# Patient Record
Sex: MALE | Race: WHITE | NOT HISPANIC OR LATINO | Employment: OTHER | URBAN - METROPOLITAN AREA
[De-identification: names, ages, dates, MRNs, and addresses within clinical notes are randomized per-mention and may not be internally consistent; named-entity substitution may affect disease eponyms.]

---

## 2017-01-04 NOTE — TELEPHONE ENCOUNTER
Was the patient seen in the last year in this department? Yes     Does patient have an active prescription for medications requested? Yes     Received Request Via: Pharmacy     Rx sent to wrong pharmacy. Please re-sign

## 2017-02-02 DIAGNOSIS — I10 ESSENTIAL HYPERTENSION: ICD-10-CM

## 2017-02-02 DIAGNOSIS — Z95.1 S/P CABG X 1: ICD-10-CM

## 2017-02-07 DIAGNOSIS — E78.5 DYSLIPIDEMIA, GOAL LDL BELOW 70: ICD-10-CM

## 2017-02-10 ENCOUNTER — TELEPHONE (OUTPATIENT)
Dept: CARDIOLOGY | Facility: MEDICAL CENTER | Age: 67
End: 2017-02-10

## 2017-02-15 ENCOUNTER — TELEPHONE (OUTPATIENT)
Dept: CARDIOLOGY | Facility: MEDICAL CENTER | Age: 67
End: 2017-02-15

## 2017-02-15 DIAGNOSIS — I25.10 CORONARY ARTERY DISEASE INVOLVING NATIVE CORONARY ARTERY OF NATIVE HEART WITHOUT ANGINA PECTORIS: ICD-10-CM

## 2017-02-15 DIAGNOSIS — Z95.1 HX OF CABG: ICD-10-CM

## 2017-02-15 DIAGNOSIS — E78.5 DYSLIPIDEMIA, GOAL LDL BELOW 70: ICD-10-CM

## 2017-02-15 NOTE — TELEPHONE ENCOUNTER
----- Message from Jessica Medrano sent at 2/15/2017 12:57 PM PST -----  Regarding: Pharmacy needs call back asap about Praulent  IA/Alexandra    Please call Irma at [facility] at [phone] ext [phone] asap. Patient's prescription for Praulent doesn't come in syringes, only in pre-filled. They need a verbal prescription and patient needs this by tomorrow, 2/16.

## 2017-02-15 NOTE — TELEPHONE ENCOUNTER
Updated Rx for Praluent auto-injector pens (150 mg every 14 days) sent to [facility].    MICHAEL RN

## 2017-03-06 ENCOUNTER — TELEPHONE (OUTPATIENT)
Dept: MEDICAL GROUP | Facility: MEDICAL CENTER | Age: 67
End: 2017-03-06

## 2017-03-06 ENCOUNTER — OFFICE VISIT (OUTPATIENT)
Dept: URGENT CARE | Facility: PHYSICIAN GROUP | Age: 67
End: 2017-03-06
Payer: MEDICARE

## 2017-03-06 VITALS
HEART RATE: 98 BPM | DIASTOLIC BLOOD PRESSURE: 68 MMHG | BODY MASS INDEX: 32.27 KG/M2 | SYSTOLIC BLOOD PRESSURE: 100 MMHG | RESPIRATION RATE: 16 BRPM | OXYGEN SATURATION: 93 % | WEIGHT: 238 LBS | TEMPERATURE: 99.1 F

## 2017-03-06 DIAGNOSIS — M54.50 ACUTE LEFT-SIDED LOW BACK PAIN WITHOUT SCIATICA: ICD-10-CM

## 2017-03-06 DIAGNOSIS — Z86.19 HISTORY OF HERPES ZOSTER: ICD-10-CM

## 2017-03-06 PROCEDURE — 1036F TOBACCO NON-USER: CPT | Performed by: FAMILY MEDICINE

## 2017-03-06 PROCEDURE — G8482 FLU IMMUNIZE ORDER/ADMIN: HCPCS | Performed by: FAMILY MEDICINE

## 2017-03-06 PROCEDURE — 1101F PT FALLS ASSESS-DOCD LE1/YR: CPT | Performed by: FAMILY MEDICINE

## 2017-03-06 PROCEDURE — 99214 OFFICE O/P EST MOD 30 MIN: CPT | Performed by: FAMILY MEDICINE

## 2017-03-06 PROCEDURE — 4040F PNEUMOC VAC/ADMIN/RCVD: CPT | Performed by: FAMILY MEDICINE

## 2017-03-06 PROCEDURE — G8598 ASA/ANTIPLAT THER USED: HCPCS | Performed by: FAMILY MEDICINE

## 2017-03-06 PROCEDURE — G8419 CALC BMI OUT NRM PARAM NOF/U: HCPCS | Performed by: FAMILY MEDICINE

## 2017-03-06 PROCEDURE — 3017F COLORECTAL CA SCREEN DOC REV: CPT | Mod: 1P | Performed by: FAMILY MEDICINE

## 2017-03-06 PROCEDURE — G8432 DEP SCR NOT DOC, RNG: HCPCS | Performed by: FAMILY MEDICINE

## 2017-03-06 RX ORDER — OXYCODONE HYDROCHLORIDE AND ACETAMINOPHEN 5; 325 MG/1; MG/1
1-2 TABLET ORAL EVERY 6 HOURS PRN
Qty: 15 TAB | Refills: 0 | Status: SHIPPED | OUTPATIENT
Start: 2017-03-06 | End: 2017-04-18

## 2017-03-06 RX ORDER — VALACYCLOVIR HYDROCHLORIDE 1 G/1
1000 TABLET, FILM COATED ORAL 3 TIMES DAILY
Qty: 21 TAB | Refills: 0 | Status: SHIPPED | OUTPATIENT
Start: 2017-03-06 | End: 2017-03-13

## 2017-03-06 ASSESSMENT — ENCOUNTER SYMPTOMS
EYE DISCHARGE: 0
FOCAL WEAKNESS: 0
SENSORY CHANGE: 0
EYE REDNESS: 0
MYALGIAS: 0

## 2017-03-06 NOTE — TELEPHONE ENCOUNTER
Should go into urgent care.  They can call in different meds and should confirm this is shingles before starting acyclovir.

## 2017-03-06 NOTE — PROGRESS NOTES
Subjective:      Bruno Loera is a 67 y.o. male who presents with Immunizations            HPI  3 days progressively worse left lower lumbar severe burning pain migration in dermatomal pattern. No rash but has PMH zoster x4. No fever. No trauma. No unwanted weight loss. No positional.   No other aggravating or alleviating factors.    Review of Systems   Eyes: Negative for discharge and redness.   Musculoskeletal: Negative for myalgias and joint pain.   Skin: Negative for itching.   Neurological: Negative for sensory change and focal weakness.        No myelopathy     .  Medications, Allergies, and current problem list reviewed today in Epic       Objective:     /68 mmHg  Pulse 98  Temp(Src) 37.3 °C (99.1 °F)  Resp 16  Wt 107.956 kg (238 lb)  SpO2 93%     Physical Exam   Constitutional: He appears well-developed and well-nourished. No distress.   Musculoskeletal:        Back:    Skin: Skin is warm and dry. No rash noted.               Assessment/Plan:     1. Acute left-sided low back pain without sciatica    - valacyclovir (VALTREX) 1 GM Tab; Take 1 Tab by mouth 3 times a day for 7 days.  Dispense: 21 Tab; Refill: 0  - oxycodone-acetaminophen (PERCOCET) 5-325 MG Tab; Take 1-2 Tabs by mouth every 6 hours as needed.  Dispense: 15 Tab; Refill: 0    2. History of herpes zoster    - valacyclovir (VALTREX) 1 GM Tab; Take 1 Tab by mouth 3 times a day for 7 days.  Dispense: 21 Tab; Refill: 0    Given history and symptoms feeling like previous zoster will treat despite a rash.   Differential diagnosis, natural history, supportive care, and indications for immediate follow-up discussed at length.

## 2017-03-06 NOTE — MR AVS SNAPSHOT
Bruno Taylor Evaristo   3/6/2017 11:15 AM   Office Visit   MRN: [id number]    Department:  [facility]   Dept Phone:  [phone]    Description:  Male : 1950   Provider:  Antonio Clarke M.D.           Reason for Visit     Immunizations poss shingle L spine back, notice Friday pm,cant sleep, constant pain      Allergies as of 3/6/2017     Allergen Noted Reactions    Statins [Hmg-Coa-R Inhibitors] 01/10/2013   Unspecified    Muscle cramps/pain, gout  Pt has tried several different ones and all with problems  TLB=8843      You were diagnosed with     Acute left-sided low back pain without sciatica   [4267089]       History of herpes zoster   [815357]         Vital Signs     Blood Pressure Pulse Temperature Respirations Weight Oxygen Saturation    100/68 mmHg 98 37.3 °C (99.1 °F) 16 107.956 kg (238 lb) 93%    Smoking Status                   Former Smoker           Basic Information     Date Of Birth Sex Race Ethnicity Preferred Language    1950 Male White Non- English      Your appointments     2017  9:00 AM   Established Patient with [facility] EXAM 4   [facility]  (--)    [address] [facility]  [place] [address]   [phone]            2017 10:00 AM   PACER CHECK ONLY with PACER CHECK-CAM B   [facility] (--)    [address] [place] [address] [address]  [place] [address] [phone]            2017 10:45 AM   FOLLOW UP with Carloz Mcallister M.D.   [facility] (--)    [address] [facility] [facility] [address]  [place] [address] [phone]            2017 10:20 AM   Established Patient with Cezar Farias M.D.   [facility] [address]    [address]  [place] [address] [phone]           You will be receiving a confirmation call a few days before your appointment from our automated call confirmation system.             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 High  11/29/2015 - Present    S/P CABG x 1 Z95.1 Medium  11/29/2015 - Present    H/O aortic valve replacement Z95.2 Medium  11/29/2015 - Present    Anemia D64.9 Low  11/29/2015 - Present    Controlled type 2 diabetes mellitus without complication, without long-term current use of insulin (CMS-HCC) E11.9 Medium  11/29/2015 - Present    History of heart valve replacement with bioprosthetic valve [Z95.4] Z95.4 Medium  12/1/2015 - Present    Cardiac pacemaker in situ Z95.0 Medium  12/17/2015 - Present    Non morbid obesity due to excess calories E66.09   8/1/2016 - Present    Ulnar neuropathy of right upper extremity G56.21   8/1/2016 - Present    Coronary artery disease involving native heart without angina pectoris I25.10   8/1/2016 - Present      Health Maintenance        Date Due Completion Dates    IMM DTaP/Tdap/Td Vaccine (1 - Tdap) 1/28/1967 ---    IMM ZOSTER VACCINE 1/28/2008 ---    IMM PNEUMOCOCCAL 65+ (ADULT) LOW/MEDIUM RISK SERIES (2 of 2 - PPSV23) 1/16/2017 1/16/2016    A1C SCREENING 5/2/2017 11/2/2016, 8/4/2016, 11/20/2015, 7/1/2015    DIABETES MONOFILAMENT / LE EXAM 8/1/2017 8/1/2016, 1/12/2015    URINE ACR / MICROALBUMIN 8/4/2017 8/4/2016    SERUM CREATININE 9/2/2017 9/2/2016, 8/4/2016, 1/13/2016, 11/30/2015, 11/29/2015, 11/28/2015, 11/28/2015, 11/27/2015, 11/26/2015, 11/25/2015, 11/24/2015, 11/20/2015, 10/29/2015, 9/21/2015, 9/20/2015, 7/1/2015    COLON CANCER SCREENING ANNUAL FIT 10/25/2017  10/25/2016    FASTING LIPID PROFILE 11/2/2017 11/2/2016, 8/4/2016, 4/13/2016, 9/21/2015, 7/1/2015    RETINAL SCREENING 12/22/2017 12/22/2016, 2/13/2015, 1/29/2015            Current Immunizations     13-VALENT PCV PREVNAR 1/16/2016 10:41 AM    Influenza TIV (IM) 11/5/2013, 1/10/2013    Influenza Vaccine Adult HD 10/12/2016, 1/16/2016 10:43 AM, 1/12/2015      Below and/or attached are the medications your provider expects you to take. Review all of your home medications and newly ordered medications with your provider and/or pharmacist. Follow medication instructions as directed by your provider and/or pharmacist. Please keep your medication list with you and share with your provider. Update the information when medications are discontinued, doses are changed, or new medications (including over-the-counter products) are added; and carry medication information at all times in the event of emergency situations     Allergies:  STATINS - Unspecified               Medications  Valid as of: March 06, 2017 - 11:53 AM    Generic Name Brand Name Tablet Size Instructions for use    Alirocumab (Solution Pen-injector) Alirocumab 150 MG/ML Inject 150 mg as instructed every 14 days.        Aspirin (Tab) aspirin 81 MG Take 81 mg by mouth every morning.        Blood Glucose Monitoring Suppl (Misc) Blood Glucose Monitoring Suppl SUPPLIES Test strips order: Test strips for tania contour meter. Sig: use tid        Coenzyme Q10   Take 300 mg by mouth every day.        Cyanocobalamin (Tab) VITAMIN B-12 100 MCG Take 100 mcg by mouth every day.        Glucose Blood (Strip) TANIA CONTOUR NEXT TEST  TEST THREE TIMES A DAY FOR UNCONTROLLED SUGAR LEVELS        HydroCHLOROthiazide (Tab) HYDRODIURIL 12.5 MG Take 1 Tab by mouth every day.        HydroCHLOROthiazide (Cap) MICROZIDE 12.5 MG Take  by mouth every day.        Insulin Glargine (Solution Pen-injector) LANTUS 100 UNIT/ML Inject 26 Units as instructed every evening.        Insulin Pen  Needle (Misc) Insulin Pen Needle 31G X 5 MM 1 Each by Does not apply route every day.        Liraglutide (Solution Pen-injector) VICTOZA 18 MG/3ML Inject 0.3 mL as instructed every day.        Lisinopril (Tab) PRINIVIL 20 MG Take 1 Tab by mouth 2 times a day.        MetFORMIN HCl (Tab) GLUCOPHAGE 1000 MG Take 1 Tab by mouth 2 times a day, with meals.        Metoprolol Tartrate (Tab) LOPRESSOR 25 MG Take 1 Tab by mouth 2 Times a Day.        Omeprazole (CAPSULE DELAYED RELEASE) PRILOSEC 20 MG Take 1 Cap by mouth every day.        Oxycodone-Acetaminophen (Tab) PERCOCET 5-325 MG Take 1-2 Tabs by mouth every 6 hours as needed.        ValACYclovir HCl (Tab) VALTREX 1 GM Take 1 Tab by mouth 3 times a day for 7 days.        .                 Medicines prescribed today were sent to:     [facility] #[id number] - [address] AT [address] [address]    Phone: [phone] Fax: [phone]    Open 24 Hours?: No    [facility] [address]    [address] [place] [address] [facility] [address]    Phone: [phone] Fax: [phone]    Open 24 Hours?: No      Medication refill instructions:       If your prescription bottle indicates you have medication refills left, it is not necessary to call your provider’s office. Please contact your pharmacy and they will refill your medication.    If your prescription bottle indicates you do not have any refills left, you may request refills at any time through one of the following ways: The online cisimple system (except Urgent Care), by calling your provider’s office, or by asking your pharmacy to contact your provider’s office with a refill request. Medication refills are processed only during regular business hours and may not be available until the next business day. Your provider may request additional information or to have a follow-up visit with you prior to refilling your medication.   *Please Note: Medication  refills are assigned a new Rx number when refilled electronically. Your pharmacy may indicate that no refills were authorized even though a new prescription for the same medication is available at the pharmacy. Please request the medicine by name with the pharmacy before contacting your provider for a refill.           [employer] Access Code: Activation code not generated  [employer] Status: Active

## 2017-03-06 NOTE — TELEPHONE ENCOUNTER
1. Caller Name: Katie patient wife                                         Call Back Number: [phone] (home)         Patient approves a detailed voicemail message: N\A    Katie, called for the patient she said that on Friday the patient broke out with shingles with a lot of pain and wanted to know if you would please send the medication Acyclovir to the patients pharmacy and something for pain? Thank you Please advise

## 2017-03-15 ENCOUNTER — TELEPHONE (OUTPATIENT)
Dept: CARDIOLOGY | Facility: MEDICAL CENTER | Age: 67
End: 2017-03-15

## 2017-03-15 DIAGNOSIS — E78.5 DYSLIPIDEMIA: ICD-10-CM

## 2017-03-15 DIAGNOSIS — I25.10 CORONARY ARTERY DISEASE INVOLVING NATIVE CORONARY ARTERY OF NATIVE HEART WITHOUT ANGINA PECTORIS: ICD-10-CM

## 2017-03-15 DIAGNOSIS — Z95.1 HX OF CABG: ICD-10-CM

## 2017-03-15 DIAGNOSIS — Z78.9 STATIN INTOLERANCE: ICD-10-CM

## 2017-03-15 NOTE — TELEPHONE ENCOUNTER
[facility] called and stated that the patient's berhane for Praulent has run out  Co-pay for the medication will be $1357/month and the patient is unable to pay for that    Currently there is no money in the berhane and the patient can be put on a wait list    [facility] call back number is [phone]    I will forward a message to the drug rep and request samples

## 2017-03-16 ENCOUNTER — TELEPHONE (OUTPATIENT)
Dept: CARDIOLOGY | Facility: MEDICAL CENTER | Age: 67
End: 2017-03-16

## 2017-03-16 NOTE — TELEPHONE ENCOUNTER
Repatha prescription sent to [facility].    Left patient a voicemail regarding medication change.    MICHAEL MARCOS

## 2017-03-16 NOTE — TELEPHONE ENCOUNTER
Received a call back from patient. Advised him of Repatha prescription and authorization process.    MICHAEL MARCOS

## 2017-03-16 NOTE — TELEPHONE ENCOUNTER
Irma from [facility] called and they stated that since they put the work into getting the patient approved for Praulent they are unable to assist with the Repatha PAR. Our office would need to submit that.   I will submit a PAR for Repatha today and start the process    Irma's number: [phone] ext [phone]

## 2017-03-22 ENCOUNTER — TELEPHONE (OUTPATIENT)
Dept: CARDIOLOGY | Facility: MEDICAL CENTER | Age: 67
End: 2017-03-22

## 2017-03-22 NOTE — TELEPHONE ENCOUNTER
Patient's samples of Repatha came in for patient to   Informed patient to  and stressed that we do not have a place to store the medication  He will attempt to  on 3/22/17  Or 3/23/17 morning at the latest (the medication should keep in the box it was shipped in)

## 2017-03-23 ENCOUNTER — TELEPHONE (OUTPATIENT)
Dept: MEDICAL GROUP | Facility: MEDICAL CENTER | Age: 67
End: 2017-03-23

## 2017-03-23 NOTE — TELEPHONE ENCOUNTER
1. Caller Name: orion                      Call Back Number: [phone] (home)       2. Message: patient is taking victoza and he has about 4 shots left and is in the donut hole and the cost is $400, he would like to know what he can take that's affordable?  He also would like his Lipid panel ordered so he can get that done before his next appt.     3. Patient approves office to leave a detailed voicemail/MyChart message: N\A

## 2017-03-28 DIAGNOSIS — E78.5 DYSLIPIDEMIA: ICD-10-CM

## 2017-04-03 ENCOUNTER — HOSPITAL ENCOUNTER (OUTPATIENT)
Dept: LAB | Facility: MEDICAL CENTER | Age: 67
End: 2017-04-03
Attending: INTERNAL MEDICINE
Payer: MEDICARE

## 2017-04-03 DIAGNOSIS — E11.9 CONTROLLED TYPE 2 DIABETES MELLITUS WITHOUT COMPLICATION, WITHOUT LONG-TERM CURRENT USE OF INSULIN (HCC): ICD-10-CM

## 2017-04-03 DIAGNOSIS — E78.5 DYSLIPIDEMIA: ICD-10-CM

## 2017-04-03 LAB
CHOLEST SERPL-MCNC: 103 MG/DL (ref 100–199)
EST. AVERAGE GLUCOSE BLD GHB EST-MCNC: 169 MG/DL
HBA1C MFR BLD: 7.5 % (ref 0–5.6)
HDLC SERPL-MCNC: 46 MG/DL
LDLC SERPL CALC-MCNC: 28 MG/DL
TRIGL SERPL-MCNC: 144 MG/DL (ref 0–149)

## 2017-04-03 PROCEDURE — 80061 LIPID PANEL: CPT

## 2017-04-06 ENCOUNTER — OFFICE VISIT (OUTPATIENT)
Dept: MEDICAL GROUP | Facility: MEDICAL CENTER | Age: 67
End: 2017-04-06
Payer: MEDICARE

## 2017-04-06 ENCOUNTER — TELEPHONE (OUTPATIENT)
Dept: CARDIOLOGY | Facility: MEDICAL CENTER | Age: 67
End: 2017-04-06

## 2017-04-06 VITALS
HEIGHT: 72 IN | BODY MASS INDEX: 32.78 KG/M2 | RESPIRATION RATE: 16 BRPM | HEART RATE: 102 BPM | OXYGEN SATURATION: 93 % | WEIGHT: 242 LBS | SYSTOLIC BLOOD PRESSURE: 126 MMHG | TEMPERATURE: 98.7 F | DIASTOLIC BLOOD PRESSURE: 68 MMHG

## 2017-04-06 DIAGNOSIS — E11.9 CONTROLLED TYPE 2 DIABETES MELLITUS WITHOUT COMPLICATION, WITHOUT LONG-TERM CURRENT USE OF INSULIN (HCC): ICD-10-CM

## 2017-04-06 PROCEDURE — G8417 CALC BMI ABV UP PARAM F/U: HCPCS | Performed by: INTERNAL MEDICINE

## 2017-04-06 PROCEDURE — 3045F PR MOST RECENT HEMOGLOBIN A1C LEVEL 7.0-9.0%: CPT | Performed by: INTERNAL MEDICINE

## 2017-04-06 PROCEDURE — 1101F PT FALLS ASSESS-DOCD LE1/YR: CPT | Performed by: INTERNAL MEDICINE

## 2017-04-06 PROCEDURE — G8432 DEP SCR NOT DOC, RNG: HCPCS | Performed by: INTERNAL MEDICINE

## 2017-04-06 PROCEDURE — 99213 OFFICE O/P EST LOW 20 MIN: CPT | Performed by: INTERNAL MEDICINE

## 2017-04-06 PROCEDURE — 4040F PNEUMOC VAC/ADMIN/RCVD: CPT | Performed by: INTERNAL MEDICINE

## 2017-04-06 PROCEDURE — 1036F TOBACCO NON-USER: CPT | Performed by: INTERNAL MEDICINE

## 2017-04-06 PROCEDURE — G8598 ASA/ANTIPLAT THER USED: HCPCS | Performed by: INTERNAL MEDICINE

## 2017-04-06 RX ORDER — GLIMEPIRIDE 4 MG/1
4 TABLET ORAL EVERY MORNING
Qty: 90 TAB | Refills: 3 | Status: SHIPPED | OUTPATIENT
Start: 2017-04-06 | End: 2017-07-14 | Stop reason: SDUPTHER

## 2017-04-06 NOTE — MR AVS SNAPSHOT
Bruno Taylor Evaristo   2017 1:20 PM   Office Visit   MRN: [id number]    Department:  [address] [place] [address]   Dept Phone:  [phone]    Description:  Male : 1950   Provider:  Cezar Farias M.D.           Reason for Visit     Diabetes           Allergies as of 2017     Allergen Noted Reactions    Statins [Hmg-Coa-R Inhibitors] 01/10/2013   Unspecified    Muscle cramps/pain, gout  Pt has tried several different ones and all with problems  AMY=0323      You were diagnosed with     Controlled type 2 diabetes mellitus without complication, without long-term current use of insulin (CMS-[facility])   [0243075]         Vital Signs     Blood Pressure Pulse Temperature Respirations Height Weight    126/68 mmHg 102 37.1 °C (98.7 °F) 16 1.829 m (6') 109.77 kg (242 lb)    Body Mass Index Oxygen Saturation Smoking Status             32.81 kg/m2 93% Former Smoker         Basic Information     Date Of Birth Sex Race Ethnicity Preferred Language    1950 Male White Non- English      Your appointments     2017  9:00 AM   Established Patient with [facility] EXAM 4   [facility]  (--)    [address] [facility]  [place] [address]   [phone]            2017 10:00 AM   PACER CHECK ONLY with PACER CHECK-CAM B   [facility] (--)    [address] [facility] [facility] [address]  [place] [address] [phone]            2017 10:45 AM   FOLLOW UP with Carloz Mcallister M.D.   [facility] (--)    [address] [facility] [address]  [place] [address] [phone]            2017 10:20 AM   Established Patient with Cezar Farias M.D.   [facility] [address]    [address]  [place] [address] [phone]           You will be receiving a confirmation call a few days before your appointment from our automated call confirmation system.              2017 10:00 AM   Established Patient with Cezar Farias M.D.   [facility] [address]    [address]  [place] [address] [phone]           You will be receiving a confirmation call a few days before your appointment from our automated call confirmation system.              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 High  11/29/2015 - Present    S/P CABG x 1 Z95.1 Medium  11/29/2015 - Present    H/O aortic valve replacement Z95.2 Medium  11/29/2015 - Present    Anemia D64.9 Low  11/29/2015 - Present    Controlled type 2 diabetes mellitus without complication, without long-term current use of insulin (CMS-HCC) E11.9 Medium  11/29/2015 - Present    History of heart valve replacement with bioprosthetic valve [Z95.4] Z95.4 Medium  12/1/2015 - Present    Cardiac pacemaker in situ Z95.0 Medium  12/17/2015 - Present    Non morbid obesity due to excess calories E66.09   8/1/2016 - Present    Ulnar neuropathy of right upper extremity G56.21   8/1/2016 - Present    Coronary artery disease involving native heart without angina pectoris I25.10   8/1/2016 - Present      Health Maintenance        Date Due Completion Dates    IMM DTaP/Tdap/Td Vaccine (1 - Tdap) 1/28/1967 ---    IMM ZOSTER VACCINE 1/28/2008 ---    IMM PNEUMOCOCCAL 65+ (ADULT) LOW/MEDIUM RISK SERIES (2 of 2 - PPSV23) 1/16/2017 1/16/2016    DIABETES MONOFILAMENT / LE EXAM 8/1/2017 8/1/2016, 1/12/2015    URINE ACR / MICROALBUMIN 8/4/2017 8/4/2016    SERUM CREATININE  9/2/2017 9/2/2016, 8/4/2016, 1/13/2016, 11/30/2015, 11/29/2015, 11/28/2015, 11/28/2015, 11/27/2015, 11/26/2015, 11/25/2015, 11/24/2015, 11/20/2015, 10/29/2015, 9/21/2015, 9/20/2015, 7/1/2015    A1C SCREENING 10/3/2017 4/3/2017, 11/2/2016, 8/4/2016, 11/20/2015, 7/1/2015    COLON CANCER SCREENING ANNUAL FIT 10/25/2017 10/25/2016    RETINAL SCREENING 12/22/2017 12/22/2016, 2/13/2015, 1/29/2015    FASTING LIPID PROFILE 4/3/2018 4/3/2017, 11/2/2016, 8/4/2016, 4/13/2016, 9/21/2015, 7/1/2015            Current Immunizations     13-VALENT PCV PREVNAR 1/16/2016 10:41 AM    Influenza TIV (IM) 11/5/2013, 1/10/2013    Influenza Vaccine Adult HD 10/12/2016, 1/16/2016 10:43 AM, 1/12/2015      Below and/or attached are the medications your provider expects you to take. Review all of your home medications and newly ordered medications with your provider and/or pharmacist. Follow medication instructions as directed by your provider and/or pharmacist. Please keep your medication list with you and share with your provider. Update the information when medications are discontinued, doses are changed, or new medications (including over-the-counter products) are added; and carry medication information at all times in the event of emergency situations     Allergies:  STATINS - Unspecified               Medications  Valid as of: April 06, 2017 -  1:28 PM    Generic Name Brand Name Tablet Size Instructions for use    Aspirin (Tab) aspirin 81 MG Take 81 mg by mouth every morning.        Blood Glucose Monitoring Suppl (Misc) Blood Glucose Monitoring Suppl SUPPLIES Test strips order: Test strips for tania contour meter. Sig: use tid        Coenzyme Q10   Take 300 mg by mouth every day.        Cyanocobalamin (Tab) VITAMIN B-12 100 MCG Take 100 mcg by mouth every day.        Evolocumab (Solution Auto-injector) Evolocumab (REPATHA) 140 MG/ML Inject 1 Each as instructed every 14 days.        Glimepiride (Tab) AMARYL 4 MG Take 1 Tab by mouth every  morning.        Glucose Blood (Strip) KARI CONTOUR NEXT TEST  TEST THREE TIMES A DAY FOR UNCONTROLLED SUGAR LEVELS        HydroCHLOROthiazide (Tab) HYDRODIURIL 12.5 MG Take 1 Tab by mouth every day.        HydroCHLOROthiazide (Cap) MICROZIDE 12.5 MG Take  by mouth every day.        Insulin Glargine (Solution Pen-injector) LANTUS 100 UNIT/ML Inject 26 Units as instructed every evening.        Insulin Pen Needle (Misc) Insulin Pen Needle 31G X 5 MM 1 Each by Does not apply route every day.        Lisinopril (Tab) PRINIVIL 20 MG Take 1 Tab by mouth 2 times a day.        MetFORMIN HCl (Tab) GLUCOPHAGE 1000 MG Take 1 Tab by mouth 2 times a day, with meals.        Metoprolol Tartrate (Tab) LOPRESSOR 25 MG Take 1 Tab by mouth 2 Times a Day.        Omeprazole (CAPSULE DELAYED RELEASE) PRILOSEC 20 MG Take 1 Cap by mouth every day.        Oxycodone-Acetaminophen (Tab) PERCOCET 5-325 MG Take 1-2 Tabs by mouth every 6 hours as needed.        .                 Medicines prescribed today were sent to:     [id number] - [place], [place] - [address] [facility]    [address] [place] [address] [place] [address]    Phone: [phone] Fax: [phone]    Open 24 Hours?: No    [place] [address] [address]    Phone: [phone] Fax: [phone]    Open 24 Hours?: No      Medication refill instructions:       If your prescription bottle indicates you have medication refills left, it is not necessary to call your provider’s office. Please contact your pharmacy and they will refill your medication.    If your prescription bottle indicates you do not have any refills left, you may request refills at any time through one of the following ways: The online [employer] system (except Urgent Care), by calling your provider’s office, or by asking your pharmacy to contact your provider’s office with a refill request. Medication refills are processed only during regular business hours  and may not be available until the next business day. Your provider may request additional information or to have a follow-up visit with you prior to refilling your medication.   *Please Note: Medication refills are assigned a new Rx number when refilled electronically. Your pharmacy may indicate that no refills were authorized even though a new prescription for the same medication is available at the pharmacy. Please request the medicine by name with the pharmacy before contacting your provider for a refill.           [employer] Access Code: Activation code not generated  Current [employer] Status: Active

## 2017-04-06 NOTE — TELEPHONE ENCOUNTER
[facility] called and stated that they recently spoke with the patient who expressed that he did not want to continue with the Repatha because the co-pay was too much ($438.13/month)  I left a message with Farzaneh godwin Repatha nurse (her number is [phone]) to contact patient to assist with patient assistant options

## 2017-04-06 NOTE — PROGRESS NOTES
CC: Follow-up diabetes    HPI:   Bruno presents today with the following.    1. Controlled type 2 diabetes mellitus without complication, without long-term current use of insulin (CMS-HCC)  Presents after having an A1c still at 7.5. He is in Victoza reports at home his weight has gone down and is wearing heavy close today in office. He is denying any hypoglycemia and compliant with his other medications. Denies any chest pain or shortness of breath. Cannot afford his Victoza currently is in the donut hole because of his cholesterol medications.      Patient Active Problem List    Diagnosis Date Noted   • AV block, 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) 11/29/2015     Priority: High   • Cardiac pacemaker in situ 12/17/2015     Priority: Medium   • History of heart valve replacement with bioprosthetic valve [Z95.4] 12/01/2015     Priority: Medium   • S/P CABG x 1 11/29/2015     Priority: Medium   • H/O aortic valve replacement 11/29/2015     Priority: Medium   • Controlled type 2 diabetes mellitus without complication, without long-term current use of insulin (CMS-HCC) 11/29/2015     Priority: Medium   • Aortic stenosis, severe 10/28/2015     Priority: Medium   • Dyslipidemia 01/12/2015     Priority: Medium   • Essential hypertension 01/10/2013     Priority: Medium   • Anemia 11/29/2015     Priority: Low   • Gastroesophageal reflux disease without esophagitis 01/10/2013     Priority: Low   • Vitamin B12 deficiency 01/10/2013     Priority: Low   • Erectile dysfunction due to arterial insufficiency 01/10/2013     Priority: Low   • Non morbid obesity due to excess calories 08/01/2016   • Ulnar neuropathy of right upper extremity 08/01/2016   • Coronary artery disease involving native heart without angina pectoris 08/01/2016       Current Outpatient Prescriptions   Medication Sig Dispense Refill   • glimepiride (AMARYL) 4 MG Tab Take 1 Tab by mouth every morning. 90 Tab 3   • Evolocumab, REPATHA, 140 MG/ML Solution Auto-injector Inject 1  Each as instructed every 14 days. 2 PEN 11   • insulin glargine (LANTUS) 100 UNIT/ML Solution Pen-injector injection Inject 26 Units as instructed every evening. 5 PEN 6   • oxycodone-acetaminophen (PERCOCET) 5-325 MG Tab Take 1-2 Tabs by mouth every 6 hours as needed. 15 Tab 0   • metoprolol (LOPRESSOR) 25 MG Tab Take 1 Tab by mouth 2 Times a Day. 180 Tab 3   • Blood Glucose Monitoring Suppl SUPPLIES Misc Test strips order: Test strips for tania contour meter. Sig: use tid 100 Each 11   • metformin (GLUCOPHAGE) 1000 MG tablet Take 1 Tab by mouth 2 times a day, with meals. 180 Tab 1   • lisinopril (PRINIVIL) 20 MG Tab Take 1 Tab by mouth 2 times a day. 30 Tab 11   • hydrochlorothiazide (MICROZIDE) 12.5 MG capsule Take  by mouth every day.  3   • Insulin Pen Needle (B-D UF III MINI PEN NEEDLES) 31G X 5 MM Misc 1 Each by Does not apply route every day. 100 Each 11   • TANIA CONTOUR NEXT TEST strip TEST THREE TIMES A DAY FOR UNCONTROLLED SUGAR LEVELS 200 Strip 11   • omeprazole (PRILOSEC) 20 MG delayed-release capsule Take 1 Cap by mouth every day. 90 Cap 3   • hydrochlorothiazide (HYDRODIURIL) 12.5 MG tablet Take 1 Tab by mouth every day. 90 Tab 3   • aspirin 81 MG tablet Take 81 mg by mouth every morning.     • Coenzyme Q10 (CO Q 10 PO) Take 300 mg by mouth every day.     • cyanocobalamin (VITAMIN B-12) 100 MCG TABS Take 100 mcg by mouth every day.       No current facility-administered medications for this visit.         Allergies as of 04/06/2017 - Bruno as Reviewed 04/06/2017   Allergen Reaction Noted   • Statins [hmg-coa-r inhibitors] Unspecified 01/10/2013        ROS: As per HPI.    /68 mmHg  Pulse 102  Temp(Src) 37.1 °C (98.7 °F)  Resp 16  Ht 1.829 m (6')  Wt 109.77 kg (242 lb)  BMI 32.81 kg/m2  SpO2 93%    Physical Exam:  Gen:         Alert and oriented, No apparent distress.  Neck:        No Lymphadenopathy or Bruits.  Lungs:     Clear to auscultation bilaterally  CV:          Regular rate and  rhythm. No murmurs, rubs or gallops.               Ext:          No clubbing, cyanosis, edema.      Assessment and Plan.   67 y.o. male with the following issues.    1. Controlled type 2 diabetes mellitus without complication, without long-term current use of insulin (CMS-[facility])  Discontinue Victoza started on Amaryl 4 mg caution about hypoglycemia recheck A1c in 3 months. Caution about hypoglycemia and other side effects.

## 2017-04-11 ENCOUNTER — TELEPHONE (OUTPATIENT)
Dept: CARDIOLOGY | Facility: MEDICAL CENTER | Age: 67
End: 2017-04-11

## 2017-04-11 NOTE — TELEPHONE ENCOUNTER
Samples came in for patient for Nidia  Patient is aware that they need to be picked up and he is due for his next shot  Mr. Loera is currently in the process of applying for patient assistance and will bring in additional paper work that our office needs to complete on 4/12/17 (he will also  samples on this day)

## 2017-04-18 ENCOUNTER — NON-PROVIDER VISIT (OUTPATIENT)
Dept: CARDIOLOGY | Facility: MEDICAL CENTER | Age: 67
End: 2017-04-18
Payer: MEDICARE

## 2017-04-18 ENCOUNTER — ANTICOAGULATION VISIT (OUTPATIENT)
Dept: VASCULAR LAB | Facility: MEDICAL CENTER | Age: 67
End: 2017-04-18
Attending: INTERNAL MEDICINE
Payer: MEDICARE

## 2017-04-18 ENCOUNTER — TELEPHONE (OUTPATIENT)
Dept: VASCULAR LAB | Facility: MEDICAL CENTER | Age: 67
End: 2017-04-18

## 2017-04-18 ENCOUNTER — OFFICE VISIT (OUTPATIENT)
Dept: CARDIOLOGY | Facility: MEDICAL CENTER | Age: 67
End: 2017-04-18
Payer: MEDICARE

## 2017-04-18 VITALS
OXYGEN SATURATION: 90 % | DIASTOLIC BLOOD PRESSURE: 80 MMHG | HEIGHT: 72 IN | SYSTOLIC BLOOD PRESSURE: 160 MMHG | WEIGHT: 243 LBS | HEART RATE: 112 BPM | BODY MASS INDEX: 32.91 KG/M2

## 2017-04-18 DIAGNOSIS — Z95.3 HISTORY OF HEART VALVE REPLACEMENT WITH BIOPROSTHETIC VALVE: ICD-10-CM

## 2017-04-18 DIAGNOSIS — Z95.0 CARDIAC PACEMAKER IN SITU: ICD-10-CM

## 2017-04-18 DIAGNOSIS — I44.2 AV BLOCK, 3RD DEGREE (HCC): ICD-10-CM

## 2017-04-18 DIAGNOSIS — E78.2 MIXED HYPERLIPIDEMIA: ICD-10-CM

## 2017-04-18 DIAGNOSIS — Z95.2 H/O AORTIC VALVE REPLACEMENT: ICD-10-CM

## 2017-04-18 DIAGNOSIS — R21 RASH: ICD-10-CM

## 2017-04-18 DIAGNOSIS — E78.5 DYSLIPIDEMIA: ICD-10-CM

## 2017-04-18 DIAGNOSIS — I10 ESSENTIAL HYPERTENSION: ICD-10-CM

## 2017-04-18 DIAGNOSIS — Z95.1 S/P CABG X 1: ICD-10-CM

## 2017-04-18 PROCEDURE — G8417 CALC BMI ABV UP PARAM F/U: HCPCS | Performed by: INTERNAL MEDICINE

## 2017-04-18 PROCEDURE — G8598 ASA/ANTIPLAT THER USED: HCPCS | Performed by: INTERNAL MEDICINE

## 2017-04-18 PROCEDURE — G8432 DEP SCR NOT DOC, RNG: HCPCS | Performed by: INTERNAL MEDICINE

## 2017-04-18 PROCEDURE — 1036F TOBACCO NON-USER: CPT | Performed by: INTERNAL MEDICINE

## 2017-04-18 PROCEDURE — 99214 OFFICE O/P EST MOD 30 MIN: CPT | Performed by: INTERNAL MEDICINE

## 2017-04-18 PROCEDURE — 4040F PNEUMOC VAC/ADMIN/RCVD: CPT | Performed by: INTERNAL MEDICINE

## 2017-04-18 PROCEDURE — 1101F PT FALLS ASSESS-DOCD LE1/YR: CPT | Performed by: INTERNAL MEDICINE

## 2017-04-18 PROCEDURE — 93280 PM DEVICE PROGR EVAL DUAL: CPT | Performed by: INTERNAL MEDICINE

## 2017-04-18 PROCEDURE — 99212 OFFICE O/P EST SF 10 MIN: CPT | Performed by: NURSE PRACTITIONER

## 2017-04-18 ASSESSMENT — ENCOUNTER SYMPTOMS: CARDIOVASCULAR NEGATIVE: 1

## 2017-04-18 NOTE — PROGRESS NOTES
Name:          Bruno Loera   YOB: 1950  Date:     4/18/2017      Cezar Farias M.D.  [address]  [place] [address]     Carloz Mcallister MD  [address], [address]  [place] [address]  Phone: [phone]  Back Line: [phone]  Fax: [phone]  E-mail: [email or web address]   Dear Dr. Farias,    We had the pleasure of seeing your patient, Bruno Loera, in Cardiology Clinic at [facility] today.    As you know, he is a 67-year-old man with a history of severe aortic stenosis status post bioprosthetic surgical AVR in 11/2015, CAD status post single vessel CABG at that time (SVG-RCA), 3rd degree heart block status post pacemaker, former nicotine dependence, type 2 diabetes, hypertension, dyslipidemia, obesity, and statin intolerance.    He is doing very well today from a cardiovascular perspective. His murmur of his bioprosthetic valve is consistent with normal valve function as confirmed by his previous echocardiogram. His lipids are wonderfully controlled on Repatha. His blood pressure in our office today was a bit high at 160/80 mmHg though at home it is generally running in the 110s to 120s mmHg. He continues on aspirin again for his valve. His pacemaker did indicate quite a bit of ventricular pacing related to which his AV delay was programmed out to allow more intrinsic conduction. If that strategy does not work, he may need a third lead. Electrophysiology will evaluate pending his next device interrogation.    We will have the patient follow-up in 6 months for repeat device interrogation, one year with me.    Thank you for the referral and please do not hesitate to contact me at any time. My contact information is listed above.    This note was dictated using Dragon speech recognition software.     A full note including my physical examination and a full list of rectified medications is available in our medical record, and can be faxed as well.    Carloz  MD Esvin  Cardiologist  [facility]

## 2017-04-18 NOTE — MR AVS SNAPSHOT
Bruno Taylor Evaristo   2017 9:00 AM   Anticoagulation Visit   MRN: [id number]    Department:  Vascular Medicine   Dept Phone:  [phone]    Description:  Male : 1950   Provider:  [facility] EXAM 4           Allergies as of 2017     Allergen Noted Reactions    Statins [Hmg-Coa-R Inhibitors] 01/10/2013   Unspecified    Muscle cramps/pain, gout  Pt has tried several different ones and all with problems  EAT=3202      You were diagnosed with     Rash   [643341]       Mixed hyperlipidemia   [272.2.ICD-9-CM]         Vital Signs     Smoking Status                   Former Smoker           Basic Information     Date Of Birth Sex Race Ethnicity Preferred Language    1950 Male White Non- English      Your appointments     2017 10:00 AM   PACER CHECK ONLY with PACER CHECK-CAM B   [facility] B (--)    [address], [address]  [place] [id number] [phone]            2017 10:45 AM   FOLLOW UP with Carloz Mcallister M.D.   [facility] B (--)    [address]  [place] [id number] [phone]            2017 10:00 AM   Established Patient with Cezar Farias M.D.   [facility] [address]    [address]  [address]  [place] [id number] [phone]           You will be receiving a confirmation call a few days before your appointment from our automated call confirmation system.            Oct 03, 2017  9:00 AM   Follow up Family Lipid Clinic with [facility] EXAM 4   [facility]  (--)    [address]  [place] [address]   [phone]              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 High  11/29/2015 - Present    S/P CABG x 1 Z95.1 Medium  11/29/2015 - Present    H/O aortic valve replacement Z95.2 Medium  11/29/2015 - Present    Anemia D64.9 Low  11/29/2015 - Present    Controlled type 2 diabetes mellitus without complication, without long-term current use of insulin (CMS-HCC) E11.9 Medium  11/29/2015 - Present    History of heart valve replacement with bioprosthetic valve [Z95.4] Z95.4 Medium  12/1/2015 - Present    Cardiac pacemaker in situ Z95.0 Medium  12/17/2015 - Present    Non morbid obesity due to excess calories E66.09   8/1/2016 - Present    Ulnar neuropathy of right upper extremity G56.21   8/1/2016 - Present    Coronary artery disease involving native heart without angina pectoris I25.10   8/1/2016 - Present    Rash R21   4/18/2017 - Present    Mixed hyperlipidemia E78.2   4/18/2017 - Present      Health Maintenance        Date Due Completion Dates    IMM DTaP/Tdap/Td Vaccine (1 - Tdap) 1/28/1967 ---    IMM ZOSTER VACCINE 1/28/2008 ---    IMM PNEUMOCOCCAL 65+ (ADULT) LOW/MEDIUM RISK SERIES (2 of 2 - PPSV23) 1/16/2017 1/16/2016    DIABETES MONOFILAMENT / LE EXAM 8/1/2017 8/1/2016, 1/12/2015    URINE ACR / MICROALBUMIN 8/4/2017 8/4/2016    SERUM CREATININE 9/2/2017 9/2/2016, 8/4/2016, 1/13/2016, 11/30/2015, 11/29/2015, 11/28/2015, 11/28/2015, 11/27/2015, 11/26/2015, 11/25/2015, 11/24/2015, 11/20/2015, 10/29/2015, 9/21/2015, 9/20/2015, 7/1/2015    A1C SCREENING 10/3/2017 4/3/2017, 11/2/2016, 8/4/2016, 11/20/2015, 7/1/2015    COLON CANCER SCREENING ANNUAL FIT 10/25/2017 10/25/2016    RETINAL SCREENING 12/22/2017 12/22/2016, 2/13/2015, 1/29/2015    FASTING LIPID PROFILE 4/3/2018 4/3/2017, 11/2/2016, 8/4/2016, 4/13/2016, 9/21/2015, 7/1/2015            Current  Immunizations     13-VALENT PCV PREVNAR 1/16/2016 10:41 AM    Influenza TIV (IM) 11/5/2013, 1/10/2013    Influenza Vaccine Adult HD 10/12/2016, 1/16/2016 10:43 AM, 1/12/2015      Below and/or attached are the medications your provider expects you to take. Review all of your home medications and newly ordered medications with your provider and/or pharmacist. Follow medication instructions as directed by your provider and/or pharmacist. Please keep your medication list with you and share with your provider. Update the information when medications are discontinued, doses are changed, or new medications (including over-the-counter products) are added; and carry medication information at all times in the event of emergency situations     Allergies:  STATINS - Unspecified               Medications  Valid as of: April 18, 2017 -  9:18 AM    Generic Name Brand Name Tablet Size Instructions for use    Aspirin (Tab) aspirin 81 MG Take 81 mg by mouth every morning.        Blood Glucose Monitoring Suppl (Misc) Blood Glucose Monitoring Suppl SUPPLIES Test strips order: Test strips for tania contour meter. Sig: use tid        Coenzyme Q10   Take 300 mg by mouth every day.        Cyanocobalamin (Tab) VITAMIN B-12 100 MCG Take 100 mcg by mouth every day.        Evolocumab (Solution Auto-injector) Evolocumab (REPATHA) 140 MG/ML Inject 1 Each as instructed every 14 days.        Glimepiride (Tab) AMARYL 4 MG Take 1 Tab by mouth every morning.        Glucose Blood (Strip) TANIA CONTOUR NEXT TEST  TEST THREE TIMES A DAY FOR UNCONTROLLED SUGAR LEVELS        HydroCHLOROthiazide (Tab) HYDRODIURIL 12.5 MG Take 1 Tab by mouth every day.        HydroCHLOROthiazide (Cap) MICROZIDE 12.5 MG Take  by mouth every day.        Insulin Glargine (Solution Pen-injector) LANTUS 100 UNIT/ML Inject 26 Units as instructed every evening.        Insulin Pen Needle (Misc) Insulin Pen Needle 31G X 5 MM 1 Each by Does not apply route every day.         Lisinopril (Tab) PRINIVIL 20 MG Take 1 Tab by mouth 2 times a day.        MetFORMIN HCl (Tab) GLUCOPHAGE 1000 MG Take 1 Tab by mouth 2 times a day, with meals.        Metoprolol Tartrate (Tab) LOPRESSOR 25 MG Take 1 Tab by mouth 2 Times a Day.        Omeprazole (CAPSULE DELAYED RELEASE) PRILOSEC 20 MG Take 1 Cap by mouth every day.        Oxycodone-Acetaminophen (Tab) PERCOCET 5-325 MG Take 1-2 Tabs by mouth every 6 hours as needed.        .                 Medicines prescribed today were sent to:     [facility]/PHARMACY #[id number] - [place], [place] - [address] [place] [address]    [address] [place] [address] [place] [address]    Phone: [phone] Fax: [phone]    Open 24 Hours?: No    [facility] [address] [address]    [address] [address] [address]    Phone: [phone] Fax: [phone]    Open 24 Hours?: No      Medication refill instructions:       If your prescription bottle indicates you have medication refills left, it is not necessary to call your provider’s office. Please contact your pharmacy and they will refill your medication.    If your prescription bottle indicates you do not have any refills left, you may request refills at any time through one of the following ways: The online [employer] system (except Urgent Care), by calling your provider’s office, or by asking your pharmacy to contact your provider’s office with a refill request. Medication refills are processed only during regular business hours and may not be available until the next business day. Your provider may request additional information or to have a follow-up visit with you prior to refilling your medication.   *Please Note: Medication refills are assigned a new Rx number when refilled electronically. Your pharmacy may indicate that no refills were authorized even though a new prescription for the same medication is available at the pharmacy. Please request the medicine by name with the pharmacy before contacting your provider  for a refill.        Your To Do List     Future Labs/Procedures Complete By Expires    NMR LIPO PROFILE  10/1/2017 4/18/2018      Referral     A referral request has been sent to our patient care coordination department. Please allow 3-5 business days for us to process this request and contact you either by phone or mail. If you do not hear from us by the 5th business day, please call us at [phone].           [employer] Access Code: Activation code not generated  Current [employer] Status: Active

## 2017-04-18 NOTE — MR AVS SNAPSHOT
Bruno Taylor Evaristo   2017 10:45 AM   Office Visit   MRN: [id number]    Department:  Heart Inst Cam B   Dept Phone:  [phone]    Description:  Male : 1950   Provider:  Carloz Mcallister M.D.           Reason for Visit     Follow-Up           Allergies as of 2017     Allergen Noted Reactions    Statins [Hmg-Coa-R Inhibitors] 01/10/2013   Unspecified    Muscle cramps/pain, gout  Pt has tried several different ones and all with problems  TWK=0124      You were diagnosed with     AV block, 3rd degree (CMS-[facility])   [906764]       Cardiac pacemaker in situ   [V45.01.ICD-9-CM]       History of heart valve replacement with bioprosthetic valve   [922617]         Vital Signs     Blood Pressure Pulse Height Weight Body Mass Index Oxygen Saturation    160/80 mmHg 112 1.829 m (6') 110.224 kg (243 lb) 32.95 kg/m2 90%    Smoking Status                   Former Smoker           Basic Information     Date Of Birth Sex Race Ethnicity Preferred Language    1950 Male White Non- English      Your appointments     2017 10:15 AM   PACER CHECK ONLY with PACER CHECK-CAM B   [facility] (--)    [address]  [place] [id number] [phone]            2017 10:00 AM   Established Patient with Cezar Farias M.D.   [facility] [address]    [address]  [address]  [place] [address] [phone]           You will be receiving a confirmation call a few days before your appointment from our automated call confirmation system.            Oct 03, 2017  9:00 AM   Follow up Family Lipid Clinic with [facility] EXAM 4   [facility]  (--)    [address]  [place] [address]   [phone]              Problem List              ICD-10-CM Priority Class Noted - Resolved    Essential hypertension I10 Medium  1/10/2013 - Present    Gastroesophageal reflux disease without  esophagitis K21.9 Low  1/10/2013 - Present    Vitamin B12 deficiency E53.8 Low  1/10/2013 - Present    Erectile dysfunction due to arterial insufficiency N52.01 Low  1/10/2013 - Present    Dyslipidemia E78.5 Medium  1/12/2015 - Present    Aortic stenosis, severe I35.0 Medium  10/28/2015 - Present    AV block, 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) I44.2 High  11/29/2015 - Present    S/P CABG x 1 Z95.1 Medium  11/29/2015 - Present    H/O aortic valve replacement Z95.2 Medium  11/29/2015 - Present    Anemia D64.9 Low  11/29/2015 - Present    Controlled type 2 diabetes mellitus without complication, without long-term current use of insulin (CMS-HCC) E11.9 Medium  11/29/2015 - Present    History of heart valve replacement with bioprosthetic valve [Z95.4] Z95.4 Medium  12/1/2015 - Present    Cardiac pacemaker in situ Z95.0 Medium  12/17/2015 - Present    Non morbid obesity due to excess calories E66.09   8/1/2016 - Present    Ulnar neuropathy of right upper extremity G56.21   8/1/2016 - Present    Coronary artery disease involving native heart without angina pectoris I25.10   8/1/2016 - Present    Rash R21   4/18/2017 - Present    Mixed hyperlipidemia E78.2   4/18/2017 - Present      Health Maintenance        Date Due Completion Dates    IMM DTaP/Tdap/Td Vaccine (1 - Tdap) 1/28/1967 ---    IMM ZOSTER VACCINE 1/28/2008 ---    IMM PNEUMOCOCCAL 65+ (ADULT) LOW/MEDIUM RISK SERIES (2 of 2 - PPSV23) 1/16/2017 1/16/2016    DIABETES MONOFILAMENT / LE EXAM 8/1/2017 8/1/2016, 1/12/2015    URINE ACR / MICROALBUMIN 8/4/2017 8/4/2016    SERUM CREATININE 9/2/2017 9/2/2016, 8/4/2016, 1/13/2016, 11/30/2015, 11/29/2015, 11/28/2015, 11/28/2015, 11/27/2015, 11/26/2015, 11/25/2015, 11/24/2015, 11/20/2015, 10/29/2015, 9/21/2015, 9/20/2015, 7/1/2015    A1C SCREENING 10/3/2017 4/3/2017, 11/2/2016, 8/4/2016, 11/20/2015, 7/1/2015    COLON CANCER SCREENING ANNUAL FIT 10/25/2017 10/25/2016    RETINAL SCREENING 12/22/2017 12/22/2016, 2/13/2015, 1/29/2015    FASTING  LIPID PROFILE 4/3/2018 4/3/2017, 11/2/2016, 8/4/2016, 4/13/2016, 9/21/2015, 7/1/2015            Current Immunizations     13-VALENT PCV PREVNAR 1/16/2016 10:41 AM    Influenza TIV (IM) 11/5/2013, 1/10/2013    Influenza Vaccine Adult HD 10/12/2016, 1/16/2016 10:43 AM, 1/12/2015      Below and/or attached are the medications your provider expects you to take. Review all of your home medications and newly ordered medications with your provider and/or pharmacist. Follow medication instructions as directed by your provider and/or pharmacist. Please keep your medication list with you and share with your provider. Update the information when medications are discontinued, doses are changed, or new medications (including over-the-counter products) are added; and carry medication information at all times in the event of emergency situations     Allergies:  STATINS - Unspecified               Medications  Valid as of: April 18, 2017 - 10:52 AM    Generic Name Brand Name Tablet Size Instructions for use    Aspirin (Tab) aspirin 81 MG Take 81 mg by mouth every morning.        Blood Glucose Monitoring Suppl (Misc) Blood Glucose Monitoring Suppl SUPPLIES Test strips order: Test strips for tania contour meter. Sig: use tid        Coenzyme Q10   Take 300 mg by mouth every day.        Cyanocobalamin (Tab) VITAMIN B-12 100 MCG Take 100 mcg by mouth every day.        Evolocumab (Solution Auto-injector) Evolocumab (REPATHA) 140 MG/ML Inject 1 Each as instructed every 14 days.        Glimepiride (Tab) AMARYL 4 MG Take 1 Tab by mouth every morning.        Glucose Blood (Strip) TANIA CONTOUR NEXT TEST  TEST THREE TIMES A DAY FOR UNCONTROLLED SUGAR LEVELS        HydroCHLOROthiazide (Tab) HYDRODIURIL 12.5 MG Take 1 Tab by mouth every day.        HydroCHLOROthiazide (Cap) MICROZIDE 12.5 MG Take  by mouth every day.        Insulin Glargine (Solution Pen-injector) LANTUS 100 UNIT/ML Inject 26 Units as instructed every evening.        Insulin  Pen Needle (Misc) Insulin Pen Needle 31G X 5 MM 1 Each by Does not apply route every day.        Lisinopril (Tab) PRINIVIL 20 MG Take 1 Tab by mouth 2 times a day.        MetFORMIN HCl (Tab) GLUCOPHAGE 1000 MG Take 1 Tab by mouth 2 times a day, with meals.        Metoprolol Tartrate (Tab) LOPRESSOR 25 MG Take 1 Tab by mouth 2 Times a Day.        Omeprazole (CAPSULE DELAYED RELEASE) PRILOSEC 20 MG Take 1 Cap by mouth every day.        Oxycodone-Acetaminophen (Tab) PERCOCET 5-325 MG Take 1-2 Tabs by mouth every 6 hours as needed.        .                 Medicines prescribed today were sent to:     [facility]/PHARMACY #[id number] - [place], [place] - [address] [place] [address]    [address] [place] [address]    Phone: [phone] Fax: [phone]    Open 24 Hours?: No      Medication refill instructions:       If your prescription bottle indicates you have medication refills left, it is not necessary to call your provider’s office. Please contact your pharmacy and they will refill your medication.    If your prescription bottle indicates you do not have any refills left, you may request refills at any time through one of the following ways: The online [employer] system (except Urgent Care), by calling your provider’s office, or by asking your pharmacy to contact your provider’s office with a refill request. Medication refills are processed only during regular business hours and may not be available until the next business day. Your provider may request additional information or to have a follow-up visit with you prior to refilling your medication.   *Please Note: Medication refills are assigned a new Rx number when refilled electronically. Your pharmacy may indicate that no refills were authorized even though a new prescription for the same medication is available at the pharmacy. Please request the medicine by name with the pharmacy before contacting your provider for a refill.           [employer] Access Code: Activation code not  generated  Current MyChart Status: Active

## 2017-04-18 NOTE — PROGRESS NOTES
[facility] LIPID CLINIC - FOLLOW UP    Date of Service:  4/18/17        Patient here for medical management of dyslipidemia.  Currently on Repatha every 2 weeks, changed 2 weeks ago d/t insurance, by Dr. Carloz Mcallister.   Pt does c/o a non papular, non pruritic rash to bilateral forearms that seems to have started when he started on Praluent.  Pt was off Praluent for a brief period of time and states that the rash improved somewhat, but has now returned while on Repatha.  Otherwise, no issues or complaints.    History of Established ASCVD: Yes   Past Medical History     Past Medical History    Diagnosis  Date    •  PHN (postherpetic neuralgia)      •  Hypertension      •  GERD (gastroesophageal reflux disease)      •  Vitamin B12 deficiency  1/10/2013    •  ED (erectile dysfunction)  1/10/2013    •  Active smoker  1/12/2015    •  Hyperlipidemia  1/12/2015    •  Statin intolerance  1/12/2015    •  TIA (transient ischemic attack)  09-    •  Complete heart block  11/28/2015    •  Aortic stenosis          11/23/15: s/p aortic valve replacement (23 mm [facility] Shawn Trifecta pericardial     •  CAD (coronary artery disease)          11/23/15: coronary artery bypass grafting x1 (reverse saphenous vein graft to     •  Indigestion      •  Arthritis          shoulders/hips/hands/back    •  Diabetes          oral and insulin and victoza    •  Pacemaker      •  Heart valve disease          has replacement valve    •  TIA  09/2015        no deficits    •  Dental disorder          upper denture    •  Pain  01-13-16        right finger & wrist    •  Heart burn      •  Pain  9/2/16        right hand           Current outpatient prescriptions:   •  glimepiride (AMARYL) 4 MG Tab, Take 1 Tab by mouth every morning., Disp: 90 Tab, Rfl: 3  •  Evolocumab, REPATHA, 140 MG/ML Solution Auto-injector, Inject 1 Each as instructed every 14 days., Disp: 2 PEN, Rfl: 11  •  insulin glargine (LANTUS) 100 UNIT/ML Solution Pen-injector injection,  Inject 26 Units as instructed every evening., Disp: 5 PEN, Rfl: 6  •  oxycodone-acetaminophen (PERCOCET) 5-325 MG Tab, Take 1-2 Tabs by mouth every 6 hours as needed., Disp: 15 Tab, Rfl: 0  •  metoprolol (LOPRESSOR) 25 MG Tab, Take 1 Tab by mouth 2 Times a Day., Disp: 180 Tab, Rfl: 3  •  Blood Glucose Monitoring Suppl SUPPLIES Misc, Test strips order: Test strips for tania contour meter. Sig: use tid, Disp: 100 Each, Rfl: 11  •  metformin (GLUCOPHAGE) 1000 MG tablet, Take 1 Tab by mouth 2 times a day, with meals., Disp: 180 Tab, Rfl: 1  •  lisinopril (PRINIVIL) 20 MG Tab, Take 1 Tab by mouth 2 times a day., Disp: 30 Tab, Rfl: 11  •  hydrochlorothiazide (MICROZIDE) 12.5 MG capsule, Take  by mouth every day., Disp: , Rfl: 3  •  Insulin Pen Needle (B-D UF III MINI PEN NEEDLES) 31G X 5 MM Misc, 1 Each by Does not apply route every day., Disp: 100 Each, Rfl: 11  •  TANIA CONTOUR NEXT TEST strip, TEST THREE TIMES A DAY FOR UNCONTROLLED SUGAR LEVELS, Disp: 200 Strip, Rfl: 11  •  omeprazole (PRILOSEC) 20 MG delayed-release capsule, Take 1 Cap by mouth every day., Disp: 90 Cap, Rfl: 3  •  hydrochlorothiazide (HYDRODIURIL) 12.5 MG tablet, Take 1 Tab by mouth every day., Disp: 90 Tab, Rfl: 3  •  aspirin 81 MG tablet, Take 81 mg by mouth every morning., Disp: , Rfl:   •  Coenzyme Q10 (CO Q 10 PO), Take 300 mg by mouth every day., Disp: , Rfl:   •  cyanocobalamin (VITAMIN B-12) 100 MCG TABS, Take 100 mcg by mouth every day., Disp: , Rfl:     No current facility-administered medications on file prior to visit.      Lab Results   Component Value Date/Time    CHOLESTEROL, 04/03/2017 06:51 AM    LDL 28 04/03/2017 06:51 AM    HDL 46 04/03/2017 06:51 AM    TRIGLYCERIDES 144 04/03/2017 06:51 AM       Lab Results   Component Value Date/Time    SODIUM 136 09/02/2016 09:54 AM    POTASSIUM 4.6 09/02/2016 09:54 AM    CHLORIDE 99 09/02/2016 09:54 AM    CO2 25 09/02/2016 09:54 AM    GLUCOSE 136* 09/02/2016 09:54 AM    BUN 12 09/02/2016  09:54 AM    CREATININE 1.15 09/02/2016 09:54 AM     Lab Results   Component Value Date/Time    ALKALINE PHOSPHATASE 28* 11/02/2016 08:35 AM    AST(SGOT) 31 11/02/2016 08:35 AM    ALT(SGPT) 34 11/02/2016 08:35 AM    TOTAL BILIRUBIN 0.5 11/02/2016 08:35 AM             ASSESSMENT AND PLAN:  Low LDL likely an initial response to 1st dose Repatha, anticipate some rebound of LDL in the next several months.  Both LDL-C and NonHDL levels at AHA/NLA goal.  4/3/17: NonHDL=57, LDL-C=28    ACC/AHA Indication for Statin Therapy:                Established ASCVD    National Lipid Association (NLA) Goal              LDL-C             <70 mg/dl/              Non-HDL-C    <100 mg/dl/    Indication for PCSK9 Inhibitor, if applicable                ASCVD with suboptimal control of LDL-C despite maximally tolerated statin              Continue Repatha 140mg q 2weeks      -Obtain NMR lipid profile in 6mo  -Referral to dermatology for rash; discussed trial of stopping Repatha x1mo to see if rash clears, then restarting, however, pt declines at this time.                F/u 6 months     SO[facility]  [facility]    CC:   Dr. Carloz Farias

## 2017-04-18 NOTE — PROGRESS NOTES
Subjective:   Bruno Loera is a 67 -year-old man with a history of severe aortic stenosis status post bioprosthetic surgical AVR in 11/2015, CAD status post single vessel CABG at that time (SVG-RCA), 3rd degree heart block status post pacemaker, former nicotine dependence, type 2 diabetes, hypertension, dyslipidemia, obesity, and statin intolerance.     He is doing well today, and has no cardiovascular complaint. He is tolerating his PCS K-9 inhibitor well with excellent response of his lipids. He tells me that his home blood pressures are generally in the 110s to 120s mmHg systolic.    He comes in with his wife is usual, both are very pleasant. They plan on taking some vacation time in [place] this summer.    Past Medical History   Diagnosis Date   • PHN (postherpetic neuralgia)    • Hypertension    • GERD (gastroesophageal reflux disease)    • Vitamin B12 deficiency 1/10/2013   • ED (erectile dysfunction) 1/10/2013   • Active smoker 1/12/2015   • Hyperlipidemia 1/12/2015   • Statin intolerance 1/12/2015   • TIA (transient ischemic attack) 09-   • Complete heart block (CMS-HCC) 11/28/2015   • Aortic stenosis      11/23/15: s/p aortic valve replacement (23 mm [employer]. Shawn Trifecta pericardial    • CAD (coronary artery disease)      11/23/15: coronary artery bypass grafting x1 (reverse saphenous vein graft to    • Indigestion    • Arthritis      shoulders/hips/hands/back   • Diabetes (CMS-[facility])      oral and insulin and victoza   • Pacemaker    • Heart valve disease      has replacement valve   • TIA 09/2015     no deficits   • Dental disorder      upper denture   • Pain 01-13-16     right finger & wrist   • Heart burn    • Pain 9/2/16     right hand   • S/P CABG x 1 11/29/2015     SVG-RCA at the time of AVR 11/2015      Past Surgical History   Procedure Laterality Date   • Cholecystectomy  2011   • Hernia repair       umblical   • Recovery  11/2/2015     Procedure: CATH LAB-[facility] W/COROS  [id number] STENOSIS I35.0;  Surgeon: Harjinder Surgery;  Location: SURGERY PRE-POST PROC UNIT [facility];  Service:    • Aortic valve replacement  11/23/2015     Procedure: AORTIC VALVE REPLACEMENT WITH LAURENCE ;  Surgeon: Ila Woods M.D.;  Location: SURGERY [facility];  Service:    • Multiple coronary artery bypass endo vein harvest  11/23/2015     Procedure: MULTIPLE CORONARY ARTERY BYPASS ENDO VEIN HARVEST X1;  Surgeon: Ila Woods M.D.;  Location: SURGERY [facility];  Service:    • Pacemaker insertion  11-29-15   • Recovery  1/15/2016     Procedure: CATH LAB PM REVISION MEDTRONIC RAMIREZ;  Surgeon: Recoveryonly Surgery;  Location: SURGERY PRE-POST PROC UNIT [facility];  Service:    • Recovery  8/29/2016     Procedure: FLUORO-Cervical CT [id number]-[id number] SEDATION;  Surgeon: Harjinder Surgery;  Location: SURGERY PRE-POST PROC UNIT [facility];  Service:    • Cubital tunnel release Right 9/7/2016     Procedure: CUBITAL TUNNEL RELEASE W/ SUBCUTANEOUS TRANSPOSITION;  Surgeon: Krzysztof Langley M.D.;  Location: SURGERY [facility];  Service:      Family History   Problem Relation Age of Onset   • Diabetes Mother    • Hypertension Neg Hx    • Hyperlipidemia Neg Hx    • Cancer Neg Hx      History   Smoking status   • Former Smoker -- 0.50 packs/day for 8 years   • Types: Cigarettes   • Quit date: 08/01/2015   Smokeless tobacco   • Never Used     Allergies   Allergen Reactions   • Statins [Hmg-Coa-R Inhibitors] Unspecified     Muscle cramps/pain, gout  Pt has tried several different ones and all with problems  VBH=[id number]     Outpatient Encounter Prescriptions as of 4/18/2017   Medication Sig Dispense Refill   • glimepiride (AMARYL) 4 MG Tab Take 1 Tab by mouth every morning. 90 Tab 3   • Evolocumab, REPATHA, 140 MG/ML Solution Auto-injector Inject 1 Each as instructed every 14 days. 2 PEN 11   • insulin glargine (LANTUS) 100 UNIT/ML Solution Pen-injector injection Inject 26 Units as instructed every  evening. 5 PEN 6   • metoprolol (LOPRESSOR) 25 MG Tab Take 1 Tab by mouth 2 Times a Day. 180 Tab 3   • metformin (GLUCOPHAGE) 1000 MG tablet Take 1 Tab by mouth 2 times a day, with meals. 180 Tab 1   • lisinopril (PRINIVIL) 20 MG Tab Take 1 Tab by mouth 2 times a day. 30 Tab 11   • hydrochlorothiazide (MICROZIDE) 12.5 MG capsule Take  by mouth every day.  3   • omeprazole (PRILOSEC) 20 MG delayed-release capsule Take 1 Cap by mouth every day. 90 Cap 3   • hydrochlorothiazide (HYDRODIURIL) 12.5 MG tablet Take 1 Tab by mouth every day. 90 Tab 3   • aspirin 81 MG tablet Take 81 mg by mouth every morning.     • Coenzyme Q10 (CO Q 10 PO) Take 300 mg by mouth every day.     • cyanocobalamin (VITAMIN B-12) 100 MCG TABS Take 100 mcg by mouth every day.     • [DISCONTINUED] oxycodone-acetaminophen (PERCOCET) 5-325 MG Tab Take 1-2 Tabs by mouth every 6 hours as needed. (Patient not taking: Reported on 4/18/2017) 15 Tab 0   • Blood Glucose Monitoring Suppl SUPPLIES Misc Test strips order: Test strips for tania contour meter. Sig: use tid 100 Each 11   • Insulin Pen Needle (B-D UF III MINI PEN NEEDLES) 31G X 5 MM Misc 1 Each by Does not apply route every day. 100 Each 11   • TANIA CONTOUR NEXT TEST strip TEST THREE TIMES A DAY FOR UNCONTROLLED SUGAR LEVELS 200 Strip 11     No facility-administered encounter medications on file as of 4/18/2017.     Review of Systems   Constitutional:             Cardiovascular: Negative.         He relates a shocklike chest discomfort at the edge of his generator about every 3 months, which he is not concerned about too much   All other systems reviewed and are negative.       Objective:   /80 mmHg  Pulse 112  Ht 1.829 m (6')  Wt 110.224 kg (243 lb)  BMI 32.95 kg/m2  SpO2 90%    Physical Exam   Constitutional: He is oriented to person, place, and time. He appears well-developed and well-nourished. No distress.   Pleasant, middle-aged appearing man in no distress accompanied by his  "wife   HENT:   Head: Normocephalic and atraumatic.   Eyes: Conjunctivae and EOM are normal. Pupils are equal, round, and reactive to light. No scleral icterus.   Neck: Neck supple. No JVD present. No tracheal deviation present.   Cardiovascular: Normal rate, regular rhythm and intact distal pulses.  Exam reveals no gallop and no friction rub.    Murmur heard.   Crescendo decrescendo systolic murmur is present with a grade of 2/6   Pulses:       Dorsalis pedis pulses are 2+ on the right side, and 2+ on the left side.   No carotid bruits. Pacemaker generator noted with a well-healed incision and no signs of infection.   Pulmonary/Chest: Effort normal and breath sounds normal. No stridor. No respiratory distress. He has no wheezes. He has no rales.   Abdominal: Soft. Bowel sounds are normal. He exhibits no distension.   Musculoskeletal: He exhibits no edema.   Neurological: He is alert and oriented to person, place, and time.   There is no change in his sensation of numbness in his right fourth and fifth fingers with palpation of his ulnar nerve as it runs through the ulnar groove of the elbow. Nor was there change in sensation with turning his head in either direction and placing downward pressure on his bregma. Full exam deferred.   Skin: Skin is warm and dry. No rash noted. He is not diaphoretic. No erythema. No pallor.   Psychiatric: He has a normal mood and affect. Judgment and thought content normal.   Vitals reviewed.       8/4/2016 08:59 11/2/2016  4/3/2017    Cholesterol,Tot 163 144 103   Triglycerides 155 (H) 139 144   HDL 52 49 46   LDL 80 67 28     Echocardiogram, 1/28/2016:  \"CONCLUSIONS  Compared to the images of the study done 09/21/2015- there has been.   Mild concentric left ventricular hypertrophy.  Left ventricular ejection fraction is visually estimated to be 65%.  Normal left ventricular systolic function.  Grade I diastolic dysfunction.  Known bioprosthetic aortic valve that is functioning " "normally with   normal transvalvular gradients.  Mild mitral regurgitation.  Mitral annular calcification.  Estimated right ventricular systolic pressure  is 30 mmHg\"    Assessment:     1. AV block, 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)     2. Cardiac pacemaker in situ     3. History of heart valve replacement with bioprosthetic valve [Z95.4]     4. H/O aortic valve replacement     5. S/P CABG x 1     6. Essential hypertension     7. Dyslipidemia         Medical Decision Making:  Today's Assessment / Status / Plan:     Medical Decision Making:    He is doing very well today from a cardiovascular perspective. His murmur of his bioprosthetic valve is consistent with normal valve function as confirmed by his previous echocardiogram. His lipids are wonderfully controlled on Repatha. His blood pressure in our office today was a bit high at 160/80 mmHg though at home it is generally running in the 110s to 120s mmHg. He continues on aspirin again for his valve. His pacemaker did indicate quite a bit of ventricular pacing related to which his AV delay was programmed out to allow more intrinsic conduction. If that strategy does not work, he may need a third lead. Electrophysiology will evaluate pending his next device interrogation.    Carloz Mcallister MD  Cardiologist, [facility] [place]     "

## 2017-04-18 NOTE — Clinical Note
Name:          Bruno Loera   YOB: 1950  Date:     4/18/2017      Cezar Farias M.D.  [address]  [place] [address]     Carloz Mcallister MD  [address], [address]  [place] [address]  Phone: [phone]  Back Line: [phone]  Fax: [phone]  E-mail: [email or web address]   Dear Dr. Farias,    We had the pleasure of seeing your patient, Bruno Loera, in Cardiology Clinic at [facility] today.    As you know, he is a 67-year-old man with a history of severe aortic stenosis status post bioprosthetic surgical AVR in 11/2015, CAD status post single vessel CABG at that time (SVG-RCA), 3rd degree heart block status post pacemaker, former nicotine dependence, type 2 diabetes, hypertension, dyslipidemia, obesity, and statin intolerance.    He is doing very well today from a cardiovascular perspective. His murmur of his bioprosthetic valve is consistent with normal valve function as confirmed by his previous echocardiogram. His lipids are wonderfully controlled on Repatha. His blood pressure in our office today was a bit high at 160/80 mmHg though at home it is generally running in the 110s to 120s mmHg. He continues on aspirin again for his valve. His pacemaker did indicate quite a bit of ventricular pacing related to which his AV delay was programmed out to allow more intrinsic conduction. If that strategy does not work, he may need a third lead. Electrophysiology will evaluate pending his next device interrogation.    We will have the patient follow-up in 6 months for repeat device interrogation, one year with me.    Thank you for the referral and please do not hesitate to contact me at any time. My contact information is listed above.    This note was dictated using Dragon speech recognition software.     A full note including my physical examination and a full list of rectified medications is available in our medical record, and can be faxed as well.    Carloz  MD Esvin  Cardiologist  [facility]

## 2017-05-18 ENCOUNTER — TELEPHONE (OUTPATIENT)
Dept: MEDICAL GROUP | Facility: MEDICAL CENTER | Age: 67
End: 2017-05-18

## 2017-05-18 NOTE — TELEPHONE ENCOUNTER
Future Appointments       Provider Department Center    5/23/2017 8:40 AM Cezar Farias M.D. [facility] [address]    7/5/2017 10:15 AM PACER CHECK-[facility]     7/6/2017 10:00 AM Cezar Farias M.D. [facility] [address]    10/3/2017 9:00 AM [facility] EXAM 4 [facility]          ESTABLISHED PATIENT PRE-VISIT PLANNING     Note: Patient will not be contacted if there is no indication to call.     1.  Reviewed note from last office visit with PCP and/or other med group provider: Yes    2.  If any orders were placed at last visit, do we have Results/Consult Notes?        •  Labs - Labs were not ordered at last office visit.       •  Imaging - Imaging was not ordered at last office visit.       •  Referrals - No referrals were ordered at last office visit.    3.  Immunizations were updated in [facility] using WebIZ?: Yes       •  Web Iz Recommendations: HEPATITIS A  HEPATITIS B PNEUMOVAX (PPSV23) TDAP VARICELLA (Chicken Pox)  ZOSTAVAX (Shingles)    4.  Patient is due for the following Health Maintenance Topics:   Health Maintenance Due   Topic Date Due   • IMM DTaP/Tdap/Td Vaccine (1 - Tdap) 01/28/1967   • IMM ZOSTER VACCINE  01/28/2008   • IMM PNEUMOCOCCAL 65+ (ADULT) LOW/MEDIUM RISK SERIES (2 of 2 - PPSV23) 01/16/2017           5.  Patient was not informed to arrive 15 min prior to their scheduled appointment and bring in their medication bottles.

## 2017-05-23 ENCOUNTER — OFFICE VISIT (OUTPATIENT)
Dept: MEDICAL GROUP | Facility: MEDICAL CENTER | Age: 67
End: 2017-05-23
Payer: MEDICARE

## 2017-05-23 VITALS
RESPIRATION RATE: 16 BRPM | BODY MASS INDEX: 33.46 KG/M2 | WEIGHT: 247 LBS | TEMPERATURE: 97.6 F | HEIGHT: 72 IN | DIASTOLIC BLOOD PRESSURE: 68 MMHG | SYSTOLIC BLOOD PRESSURE: 130 MMHG | OXYGEN SATURATION: 100 % | HEART RATE: 99 BPM

## 2017-05-23 DIAGNOSIS — R42 VERTIGO: ICD-10-CM

## 2017-05-23 DIAGNOSIS — E11.9 CONTROLLED TYPE 2 DIABETES MELLITUS WITHOUT COMPLICATION, WITHOUT LONG-TERM CURRENT USE OF INSULIN (HCC): ICD-10-CM

## 2017-05-23 DIAGNOSIS — F41.8 SITUATIONAL ANXIETY: ICD-10-CM

## 2017-05-23 DIAGNOSIS — Z23 NEED FOR PNEUMOCOCCAL VACCINATION: ICD-10-CM

## 2017-05-23 PROCEDURE — 99214 OFFICE O/P EST MOD 30 MIN: CPT | Performed by: INTERNAL MEDICINE

## 2017-05-23 PROCEDURE — G8432 DEP SCR NOT DOC, RNG: HCPCS | Performed by: INTERNAL MEDICINE

## 2017-05-23 PROCEDURE — 1101F PT FALLS ASSESS-DOCD LE1/YR: CPT | Performed by: INTERNAL MEDICINE

## 2017-05-23 PROCEDURE — 4040F PNEUMOC VAC/ADMIN/RCVD: CPT | Performed by: INTERNAL MEDICINE

## 2017-05-23 PROCEDURE — G8598 ASA/ANTIPLAT THER USED: HCPCS | Performed by: INTERNAL MEDICINE

## 2017-05-23 PROCEDURE — 1036F TOBACCO NON-USER: CPT | Performed by: INTERNAL MEDICINE

## 2017-05-23 PROCEDURE — G8417 CALC BMI ABV UP PARAM F/U: HCPCS | Performed by: INTERNAL MEDICINE

## 2017-05-23 PROCEDURE — 3045F PR MOST RECENT HEMOGLOBIN A1C LEVEL 7.0-9.0%: CPT | Performed by: INTERNAL MEDICINE

## 2017-05-23 NOTE — PROGRESS NOTES
CC: Follow-up multiple issues    HPI:   Bruno presents today with the following.    1. Controlled type 2 diabetes mellitus without complication, without long-term current use of insulin (CMS-HCC)  Presents after being switched to Amaryl for price considerations. He reports no hypoglycemia however his weight has gone up slightly. He reports his blood sugars are no different in terms of control. He reports fasting blood sugars in the 160s.    2. Vertigo  He is complaining of dizziness he is had vertigo in the past relates similar symptoms of head movement causing moments of spinning with slight nausea. He was sent to physical therapy in the past.     3. Situational anxiety  Follow-up his medical problems he is somewhat hesitant to go out of town on vacations. He does report going up to [place] and short distance as well as camping. It is a problem for his wife who recently had a loss of family member. He is going on a trip with her but again it does bring out some anxiety for him. He does not report is affecting his quality of life.      Patient Active Problem List    Diagnosis Date Noted   • AV block, 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) 11/29/2015     Priority: High   • Cardiac pacemaker in situ 12/17/2015     Priority: Medium   • History of heart valve replacement with bioprosthetic valve [Z95.4] 12/01/2015     Priority: Medium   • S/P CABG x 1 11/29/2015     Priority: Medium   • H/O aortic valve replacement 11/29/2015     Priority: Medium   • Controlled type 2 diabetes mellitus without complication, without long-term current use of insulin (CMS-HCC) 11/29/2015     Priority: Medium   • Aortic stenosis, severe 10/28/2015     Priority: Medium   • Dyslipidemia 01/12/2015     Priority: Medium   • Essential hypertension 01/10/2013     Priority: Medium   • Gastroesophageal reflux disease without esophagitis 01/10/2013     Priority: Low   • Vitamin B12 deficiency 01/10/2013     Priority: Low   • Erectile dysfunction due to arterial  insufficiency 01/10/2013     Priority: Low   • Rash 04/18/2017   • Mixed hyperlipidemia 04/18/2017   • Non morbid obesity due to excess calories 08/01/2016   • Ulnar neuropathy of right upper extremity 08/01/2016   • Coronary artery disease involving native heart without angina pectoris 08/01/2016       Current Outpatient Prescriptions   Medication Sig Dispense Refill   • insulin glargine (LANTUS) 100 UNIT/ML Solution Pen-injector injection Inject 30 Units as instructed every evening. 5 PEN 6   • glimepiride (AMARYL) 4 MG Tab Take 1 Tab by mouth every morning. 90 Tab 3   • Evolocumab, REPATHA, 140 MG/ML Solution Auto-injector Inject 1 Each as instructed every 14 days. 2 PEN 11   • metoprolol (LOPRESSOR) 25 MG Tab Take 1 Tab by mouth 2 Times a Day. 180 Tab 3   • Blood Glucose Monitoring Suppl SUPPLIES Misc Test strips order: Test strips for tania contour meter. Sig: use tid 100 Each 11   • metformin (GLUCOPHAGE) 1000 MG tablet Take 1 Tab by mouth 2 times a day, with meals. 180 Tab 1   • lisinopril (PRINIVIL) 20 MG Tab Take 1 Tab by mouth 2 times a day. 30 Tab 11   • Insulin Pen Needle (B-D UF III MINI PEN NEEDLES) 31G X 5 MM Misc 1 Each by Does not apply route every day. 100 Each 11   • TANIA CONTOUR NEXT TEST strip TEST THREE TIMES A DAY FOR UNCONTROLLED SUGAR LEVELS 200 Strip 11   • omeprazole (PRILOSEC) 20 MG delayed-release capsule Take 1 Cap by mouth every day. 90 Cap 3   • hydrochlorothiazide (HYDRODIURIL) 12.5 MG tablet Take 1 Tab by mouth every day. 90 Tab 3   • aspirin 81 MG tablet Take 81 mg by mouth every morning.     • Coenzyme Q10 (CO Q 10 PO) Take 300 mg by mouth every day.     • cyanocobalamin (VITAMIN B-12) 100 MCG TABS Take 100 mcg by mouth every day.       No current facility-administered medications for this visit.         Allergies as of 05/23/2017 - Bruno as Reviewed 05/23/2017   Allergen Reaction Noted   • Statins [hmg-coa-r inhibitors] Unspecified 01/10/2013        ROS: As per HPI.    /68  mmHg  Pulse 99  Temp(Src) 36.4 °C (97.6 °F)  Resp 16  Ht 1.829 m (6')  Wt 112.038 kg (247 lb)  BMI 33.49 kg/m2  SpO2 100%    Physical Exam:  Gen:         Alert and oriented, No apparent distress.  Neck:        No Lymphadenopathy or Bruits.  Lungs:     Clear to auscultation bilaterally  CV:          Regular rate and rhythm. No murmurs, rubs or gallops.               Ext:          No clubbing, cyanosis, edema.      Assessment and Plan.   67 y.o. male with the following issues.    1. Controlled type 2 diabetes mellitus without complication, without long-term current use of insulin (CMS-HCC)  Diabetes seems to be under adequate control recheck in 2 months.  - BASIC METABOLIC PANEL; Future  - HEMOGLOBIN A1C; Future  - MICROALBUMIN CREAT RATIO URINE; Future    2. Vertigo  Symptoms seem be related to vertigo given a description follow-up if not improving    3. Situational anxiety  We'll see back after his trip with his anxiety is worse may consider counseling.    4. Need for pneumococcal vaccination    - PNEUMOCOCCAL POLYSACCHARIDE VACCINE 23-VALENT =>3YO SQ/IM

## 2017-05-23 NOTE — MR AVS SNAPSHOT
Bruno Taylor Evaristo   2017 8:40 AM   Office Visit   MRN: [id number]    Department:  [address] [place] [address]   Dept Phone:  [phone]    Description:  Male : 1950   Provider:  Cezar Farias M.D.           Reason for Visit     Dizziness           Allergies as of 2017     Allergen Noted Reactions    Statins [Hmg-Coa-R Inhibitors] 01/10/2013   Unspecified    Muscle cramps/pain, gout  Pt has tried several different ones and all with problems  [id number]      You were diagnosed with     Controlled type 2 diabetes mellitus without complication, without long-term current use of insulin (CMS-[facility])   [0203558]       Vertigo   [667201]       Situational anxiety   [516603]       Need for pneumococcal vaccination   [854686]         Vital Signs     Blood Pressure Pulse Temperature Respirations Height Weight    130/68 mmHg 99 36.4 °C (97.6 °F) 16 1.829 m (6') 112.038 kg (247 lb)    Body Mass Index Oxygen Saturation Smoking Status             33.49 kg/m2 100% Former Smoker         Basic Information     Date Of Birth Sex Race Ethnicity Preferred Language    1950 Male White Non- English      Your appointments     2017 10:15 AM   PACER CHECK ONLY with PACER CHECK-CAM B   [facility]-CAM B (--)    [address], [address]  [place] [id number] [phone]            2017 10:00 AM   Established Patient with Cezar Farias M.D.   [facility] [address]    [address]  [address]  [place] [id number] [phone]           You will be receiving a confirmation call a few days before your appointment from our automated call confirmation system.            Oct 03, 2017  9:00 AM   Follow up Family Lipid Clinic with [facility] EXAM 4   [facility]  (--)    [address] [facility]  [place] [address]   [phone]              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 High  11/29/2015 - Present    S/P CABG x 1 Z95.1 Medium  11/29/2015 - Present    H/O aortic valve replacement Z95.2 Medium  11/29/2015 - Present    Controlled type 2 diabetes mellitus without complication, without long-term current use of insulin (CMS-HCC) E11.9 Medium  11/29/2015 - Present    History of heart valve replacement with bioprosthetic valve [Z95.4] Z95.4 Medium  12/1/2015 - Present    Cardiac pacemaker in situ Z95.0 Medium  12/17/2015 - Present    Non morbid obesity due to excess calories E66.09   8/1/2016 - Present    Ulnar neuropathy of right upper extremity G56.21   8/1/2016 - Present    Coronary artery disease involving native heart without angina pectoris I25.10   8/1/2016 - Present    Rash R21   4/18/2017 - Present    Mixed hyperlipidemia E78.2   4/18/2017 - Present      Health Maintenance        Date Due Completion Dates    IMM PNEUMOCOCCAL 65+ (ADULT) LOW/MEDIUM RISK SERIES (2 of 2 - PPSV23) 1/16/2017 1/16/2016    DIABETES MONOFILAMENT / LE EXAM 8/1/2017 8/1/2016, 1/12/2015    URINE ACR / MICROALBUMIN 8/4/2017 8/4/2016    SERUM CREATININE 9/2/2017 9/2/2016, 8/4/2016, 1/13/2016, 11/30/2015, 11/29/2015, 11/28/2015, 11/28/2015, 11/27/2015, 11/26/2015, 11/25/2015, 11/24/2015, 11/20/2015, 10/29/2015, 9/21/2015, 9/20/2015, 7/1/2015    A1C SCREENING 10/3/2017 4/3/2017, 11/2/2016, 8/4/2016, 11/20/2015, 7/1/2015    COLON CANCER SCREENING ANNUAL FIT 10/25/2017 10/25/2016    RETINAL SCREENING 12/22/2017 12/22/2016, 2/13/2015, 1/29/2015    FASTING LIPID PROFILE 4/3/2018 4/3/2017,  11/2/2016, 8/4/2016, 4/13/2016, 9/21/2015, 7/1/2015    IMM DTaP/Tdap/Td Vaccine (2 - Td) 5/23/2024 5/23/2014            Current Immunizations     13-VALENT PCV PREVNAR 1/16/2016 10:41 AM    Influenza TIV (IM) 11/5/2013, 1/10/2013    Influenza Vaccine Adult HD 10/12/2016, 1/16/2016 10:43 AM, 1/12/2015    Pneumococcal polysaccharide vaccine (PPSV-23)  Incomplete    SHINGLES VACCINE 5/23/2014    Tdap Vaccine 5/23/2014      Below and/or attached are the medications your provider expects you to take. Review all of your home medications and newly ordered medications with your provider and/or pharmacist. Follow medication instructions as directed by your provider and/or pharmacist. Please keep your medication list with you and share with your provider. Update the information when medications are discontinued, doses are changed, or new medications (including over-the-counter products) are added; and carry medication information at all times in the event of emergency situations     Allergies:  STATINS - Unspecified               Medications  Valid as of: May 23, 2017 -  9:05 AM    Generic Name Brand Name Tablet Size Instructions for use    Aspirin (Tab) aspirin 81 MG Take 81 mg by mouth every morning.        Blood Glucose Monitoring Suppl (Misc) Blood Glucose Monitoring Suppl SUPPLIES Test strips order: Test strips for tania contour meter. Sig: use tid        Coenzyme Q10   Take 300 mg by mouth every day.        Cyanocobalamin (Tab) VITAMIN B-12 100 MCG Take 100 mcg by mouth every day.        Evolocumab (Solution Auto-injector) Evolocumab (REPATHA) 140 MG/ML Inject 1 Each as instructed every 14 days.        Glimepiride (Tab) AMARYL 4 MG Take 1 Tab by mouth every morning.        Glucose Blood (Strip) TANIA CONTOUR NEXT TEST  TEST THREE TIMES A DAY FOR UNCONTROLLED SUGAR LEVELS        HydroCHLOROthiazide (Tab) HYDRODIURIL 12.5 MG Take 1 Tab by mouth every day.        Insulin Glargine (Solution Pen-injector) LANTUS 100 UNIT/ML  Inject 30 Units as instructed every evening.        Insulin Pen Needle (Misc) Insulin Pen Needle 31G X 5 MM 1 Each by Does not apply route every day.        Lisinopril (Tab) PRINIVIL 20 MG Take 1 Tab by mouth 2 times a day.        MetFORMIN HCl (Tab) GLUCOPHAGE 1000 MG Take 1 Tab by mouth 2 times a day, with meals.        Metoprolol Tartrate (Tab) LOPRESSOR 25 MG Take 1 Tab by mouth 2 Times a Day.        Omeprazole (CAPSULE DELAYED RELEASE) PRILOSEC 20 MG Take 1 Cap by mouth every day.        .                 Medicines prescribed today were sent to:     [facility]/PHARMACY #[id number] - [place], [place] - [address] [place] [address]    [address] [place] [facility]. [place] [address]    Phone: [phone] Fax: [phone]    Open 24 Hours?: No      Medication refill instructions:       If your prescription bottle indicates you have medication refills left, it is not necessary to call your provider’s office. Please contact your pharmacy and they will refill your medication.    If your prescription bottle indicates you do not have any refills left, you may request refills at any time through one of the following ways: The online [employer] system (except Urgent Care), by calling your provider’s office, or by asking your pharmacy to contact your provider’s office with a refill request. Medication refills are processed only during regular business hours and may not be available until the next business day. Your provider may request additional information or to have a follow-up visit with you prior to refilling your medication.   *Please Note: Medication refills are assigned a new Rx number when refilled electronically. Your pharmacy may indicate that no refills were authorized even though a new prescription for the same medication is available at the pharmacy. Please request the medicine by name with the pharmacy before contacting your provider for a refill.        Your To Do List     Future Labs/Procedures Complete By Expires    BASIC METABOLIC PANEL  As  directed 5/24/2018    HEMOGLOBIN A1C  As directed 5/24/2018    MICROALBUMIN CREAT RATIO URINE  As directed 5/24/2018         MyChart Access Code: Activation code not generated  Current MyChart Status: Active

## 2017-07-05 ENCOUNTER — NON-PROVIDER VISIT (OUTPATIENT)
Dept: CARDIOLOGY | Facility: MEDICAL CENTER | Age: 67
End: 2017-07-05
Payer: MEDICARE

## 2017-07-07 ENCOUNTER — TELEPHONE (OUTPATIENT)
Dept: MEDICAL GROUP | Facility: MEDICAL CENTER | Age: 67
End: 2017-07-07

## 2017-07-07 NOTE — TELEPHONE ENCOUNTER
Future Appointments       Provider Department Center    7/14/2017 8:00 AM Cezar Farias M.D. [facility] [address]    8/4/2017 9:40 AM Jonathan Ray M.D. [facility]-CAM B     10/3/2017 9:00 AM [facility] EXAM 4 [facility]          ESTABLISHED PATIENT PRE-VISIT PLANNING     Note: Patient will not be contacted if there is no indication to call.     1.  Reviewed note from last office visit with PCP and/or other med group provider: Yes    2.  If any orders were placed at last visit, do we have Results/Consult Notes?        •  Labs - Labs ordered, NOT completed. Patient advised to complete prior to next appointment.VIA VOICEMAIL       •  Imaging - Imaging was not ordered at last office visit.       •  Referrals - No referrals were ordered at last office visit.    3.  Immunizations were updated in Epic using WebIZ?: Yes       •  Web Iz Recommendations: HEPATITIS A  PNEUMOVAX (PPSV23)    4.  Patient is due for the following Health Maintenance Topics:   Health Maintenance Due   Topic Date Due   • IMM PNEUMOCOCCAL 65+ (ADULT) LOW/MEDIUM RISK SERIES (2 of 2 - PPSV23) 01/16/2017           5.  Patient was informed VIA VOICEMAIL to arrive 15 min prior to their scheduled appointment and bring in their medication bottles.

## 2017-07-13 ENCOUNTER — HOSPITAL ENCOUNTER (OUTPATIENT)
Dept: LAB | Facility: MEDICAL CENTER | Age: 67
End: 2017-07-13
Attending: INTERNAL MEDICINE
Payer: MEDICARE

## 2017-07-13 DIAGNOSIS — E11.9 CONTROLLED TYPE 2 DIABETES MELLITUS WITHOUT COMPLICATION, WITHOUT LONG-TERM CURRENT USE OF INSULIN (HCC): ICD-10-CM

## 2017-07-13 LAB
ANION GAP SERPL CALC-SCNC: 9 MMOL/L (ref 0–11.9)
BUN SERPL-MCNC: 14 MG/DL (ref 8–22)
CALCIUM SERPL-MCNC: 9.1 MG/DL (ref 8.5–10.5)
CHLORIDE SERPL-SCNC: 101 MMOL/L (ref 96–112)
CO2 SERPL-SCNC: 28 MMOL/L (ref 20–33)
CREAT SERPL-MCNC: 0.97 MG/DL (ref 0.5–1.4)
CREAT UR-MCNC: 204.6 MG/DL
EST. AVERAGE GLUCOSE BLD GHB EST-MCNC: 197 MG/DL
GFR SERPL CREATININE-BSD FRML MDRD: >60 ML/MIN/1.73 M 2
GLUCOSE SERPL-MCNC: 181 MG/DL (ref 65–99)
HBA1C MFR BLD: 8.5 % (ref 0–5.6)
MICROALBUMIN UR-MCNC: 3.8 MG/DL
MICROALBUMIN/CREAT UR: 19 MG/G (ref 0–30)
POTASSIUM SERPL-SCNC: 4.2 MMOL/L (ref 3.6–5.5)
SODIUM SERPL-SCNC: 138 MMOL/L (ref 135–145)

## 2017-07-13 PROCEDURE — 36415 COLL VENOUS BLD VENIPUNCTURE: CPT

## 2017-07-13 PROCEDURE — 80048 BASIC METABOLIC PNL TOTAL CA: CPT

## 2017-07-13 PROCEDURE — 82570 ASSAY OF URINE CREATININE: CPT

## 2017-07-13 PROCEDURE — 82043 UR ALBUMIN QUANTITATIVE: CPT

## 2017-07-13 PROCEDURE — 83036 HEMOGLOBIN GLYCOSYLATED A1C: CPT

## 2017-07-14 ENCOUNTER — OFFICE VISIT (OUTPATIENT)
Dept: MEDICAL GROUP | Facility: MEDICAL CENTER | Age: 67
End: 2017-07-14
Payer: MEDICARE

## 2017-07-14 VITALS
OXYGEN SATURATION: 95 % | DIASTOLIC BLOOD PRESSURE: 72 MMHG | TEMPERATURE: 99 F | SYSTOLIC BLOOD PRESSURE: 126 MMHG | BODY MASS INDEX: 33.75 KG/M2 | WEIGHT: 249.2 LBS | RESPIRATION RATE: 16 BRPM | HEART RATE: 89 BPM | HEIGHT: 72 IN

## 2017-07-14 DIAGNOSIS — E78.5 DYSLIPIDEMIA: ICD-10-CM

## 2017-07-14 DIAGNOSIS — E11.9 CONTROLLED TYPE 2 DIABETES MELLITUS WITHOUT COMPLICATION, WITHOUT LONG-TERM CURRENT USE OF INSULIN (HCC): ICD-10-CM

## 2017-07-14 DIAGNOSIS — I10 ESSENTIAL HYPERTENSION: ICD-10-CM

## 2017-07-14 DIAGNOSIS — Z23 NEED FOR PNEUMOCOCCAL VACCINATION: ICD-10-CM

## 2017-07-14 PROCEDURE — 99214 OFFICE O/P EST MOD 30 MIN: CPT | Mod: 25 | Performed by: INTERNAL MEDICINE

## 2017-07-14 PROCEDURE — G0009 ADMIN PNEUMOCOCCAL VACCINE: HCPCS | Performed by: INTERNAL MEDICINE

## 2017-07-14 PROCEDURE — 90732 PPSV23 VACC 2 YRS+ SUBQ/IM: CPT | Performed by: INTERNAL MEDICINE

## 2017-07-14 RX ORDER — GLIMEPIRIDE 4 MG/1
4 TABLET ORAL 2 TIMES DAILY
Qty: 180 TAB | Refills: 3 | Status: SHIPPED | OUTPATIENT
Start: 2017-07-14 | End: 2017-10-31 | Stop reason: SDUPTHER

## 2017-07-14 ASSESSMENT — PATIENT HEALTH QUESTIONNAIRE - PHQ9: CLINICAL INTERPRETATION OF PHQ2 SCORE: 0

## 2017-07-14 NOTE — PROGRESS NOTES
CC: Follow-up multiple issues    HPI:   Bruno presents today with the following.    1. Controlled type 2 diabetes mellitus without complication, without long-term current use of insulin (CMS-HCC)  Patient recently switched from Victoza and Amaryl because of financial considerations with the donut hole. A1c is down from 7.5-8.5. He has gained weight slightly denying any hypoglycemia with the new medication.    2. Essential hypertension  Blood pressure well controlled denying any chest pains or shortness of breath. He is possibly getting new pacemaker placement because of scar tissue but no major rhythm issues.    3. Dyslipidemia  Cholesterols been well controlled maintain on medications without myalgia.    4. Need for pneumococcal vaccination        Patient Active Problem List    Diagnosis Date Noted   • AV block, 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) 11/29/2015     Priority: High   • Cardiac pacemaker in situ 12/17/2015     Priority: Medium   • History of heart valve replacement with bioprosthetic valve [Z95.4] 12/01/2015     Priority: Medium   • S/P CABG x 1 11/29/2015     Priority: Medium   • H/O aortic valve replacement 11/29/2015     Priority: Medium   • Controlled type 2 diabetes mellitus without complication, without long-term current use of insulin (CMS-HCC) 11/29/2015     Priority: Medium   • Aortic stenosis, severe 10/28/2015     Priority: Medium   • Dyslipidemia 01/12/2015     Priority: Medium   • Essential hypertension 01/10/2013     Priority: Medium   • Gastroesophageal reflux disease without esophagitis 01/10/2013     Priority: Low   • Vitamin B12 deficiency 01/10/2013     Priority: Low   • Erectile dysfunction due to arterial insufficiency 01/10/2013     Priority: Low   • Rash 04/18/2017   • Mixed hyperlipidemia 04/18/2017   • Non morbid obesity due to excess calories 08/01/2016   • Ulnar neuropathy of right upper extremity 08/01/2016   • Coronary artery disease involving native heart without angina pectoris 08/01/2016  "      Current Outpatient Prescriptions   Medication Sig Dispense Refill   • metformin (GLUCOPHAGE) 1000 MG tablet Take 1 Tab by mouth 2 times a day, with meals. 180 Tab 1   • glimepiride (AMARYL) 4 MG Tab Take 1 Tab by mouth 2 times a day. 180 Tab 3   • insulin glargine (LANTUS) 100 UNIT/ML Solution Pen-injector injection Inject 30 Units as instructed every evening. 5 PEN 11   • Evolocumab, REPATHA, 140 MG/ML Solution Auto-injector Inject 1 Each as instructed every 14 days. 2 PEN 11   • metoprolol (LOPRESSOR) 25 MG Tab Take 1 Tab by mouth 2 Times a Day. 180 Tab 3   • Blood Glucose Monitoring Suppl SUPPLIES Misc Test strips order: Test strips for tania contour meter. Sig: use tid 100 Each 11   • lisinopril (PRINIVIL) 20 MG Tab Take 1 Tab by mouth 2 times a day. 30 Tab 11   • Insulin Pen Needle (B-D UF III MINI PEN NEEDLES) 31G X 5 MM Misc 1 Each by Does not apply route every day. 100 Each 11   • TANIA CONTOUR NEXT TEST strip TEST THREE TIMES A DAY FOR UNCONTROLLED SUGAR LEVELS 200 Strip 11   • omeprazole (PRILOSEC) 20 MG delayed-release capsule Take 1 Cap by mouth every day. 90 Cap 3   • hydrochlorothiazide (HYDRODIURIL) 12.5 MG tablet Take 1 Tab by mouth every day. 90 Tab 3   • aspirin 81 MG tablet Take 81 mg by mouth every morning.     • Coenzyme Q10 (CO Q 10 PO) Take 300 mg by mouth every day.     • cyanocobalamin (VITAMIN B-12) 100 MCG TABS Take 100 mcg by mouth every day.       No current facility-administered medications for this visit.         Allergies as of 07/14/2017 - Bruno as Reviewed 07/14/2017   Allergen Reaction Noted   • Statins [hmg-coa-r inhibitors] Unspecified 01/10/2013        ROS: As per HPI.    /72 mmHg  Pulse 89  Temp(Src) 37.2 °C (99 °F)  Resp 16  Ht 1.829 m (6' 0.01\")  Wt 113.036 kg (249 lb 3.2 oz)  BMI 33.79 kg/m2  SpO2 95%    Physical Exam:  Gen:         Alert and oriented, No apparent distress.  Neck:        No Lymphadenopathy or Bruits.  Lungs:     Clear to auscultation " bilaterally  CV:          Regular rate and rhythm. No murmurs, rubs or gallops.               Ext:          No clubbing, cyanosis, edema.      Assessment and Plan.   67 y.o. male with the following issues.    1. Controlled type 2 diabetes mellitus without complication, without long-term current use of insulin (CMS-HCC)  Increasing Amaryl to twice a day cautioned about hypoglycemia see back in 3 months.    2. Essential hypertension  Currently well controlled, Discuss diet, exercise and salt restriction.    3. Dyslipidemia  Lipids currently well controlled. Discussed continued diet and exercise recheck 6 months to 1 year.    4. Need for pneumococcal vaccination    - PNEUMOCOCCAL POLYSACCHARIDE VACCINE 23-VALENT =>1YO SQ/IM

## 2017-07-14 NOTE — MR AVS SNAPSHOT
"        Bruno Loera   2017 8:00 AM   Office Visit   MRN: [id number]    Department:  [address] [place] [address] Group   Dept Phone:  [phone]    Description:  Male : 1950   Provider:  Cezar Farias M.D.           Reason for Visit     Diabetes Mellitus reveiw lab results      Allergies as of 2017     Allergen Noted Reactions    Statins [Hmg-Coa-R Inhibitors] 01/10/2013   Unspecified    Muscle cramps/pain, gout  Pt has tried several different ones and all with problems  [id number]      You were diagnosed with     Controlled type 2 diabetes mellitus without complication, without long-term current use of insulin (CMS-[facility])   [3875294]       Essential hypertension   [2319611]       Dyslipidemia   [029442]       Need for pneumococcal vaccination   [933207]         Vital Signs     Blood Pressure Pulse Temperature Respirations Height Weight    126/72 mmHg 89 37.2 °C (99 °F) 16 1.829 m (6' 0.01\") 113.036 kg (249 lb 3.2 oz)    Body Mass Index Oxygen Saturation Smoking Status             33.79 kg/m2 95% Former Smoker         Basic Information     Date Of Birth Sex Race Ethnicity Preferred Language    1950 Male White Non- English      Your appointments     Aug 04, 2017  9:40 AM   PREVIOUS PATIENT with Jonathan Ray M.D.   [facility]-CAM B (--)    [address], [address]  [place] [id number] [phone]            Oct 03, 2017  9:00 AM   Follow up Family Lipid Clinic with [facility] EXAM 4   [facility]  (--)    [address] [facility] [place] [address]   [phone]            Oct 03, 2017  9:40 AM   Established Patient with Cezar Farias M.D.   [facility] [address]    [address]  [address]  [place] [address] [phone]           You will be receiving a confirmation call a few days before your appointment from our automated call confirmation system.              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) I44.2 High  11/29/2015 - Present    S/P CABG x 1 Z95.1 Medium  11/29/2015 - Present    H/O aortic valve replacement Z95.2 Medium  11/29/2015 - Present    Controlled type 2 diabetes mellitus without complication, without long-term current use of insulin (CMS-HCC) E11.9 Medium  11/29/2015 - Present    History of heart valve replacement with bioprosthetic valve [Z95.4] Z95.4 Medium  12/1/2015 - Present    Cardiac pacemaker in situ Z95.0 Medium  12/17/2015 - Present    Non morbid obesity due to excess calories E66.09   8/1/2016 - Present    Ulnar neuropathy of right upper extremity G56.21   8/1/2016 - Present    Coronary artery disease involving native heart without angina pectoris I25.10   8/1/2016 - Present    Rash R21   4/18/2017 - Present    Mixed hyperlipidemia E78.2   4/18/2017 - Present      Health Maintenance        Date Due Completion Dates    DIABETES MONOFILAMENT / LE EXAM 8/1/2017 8/1/2016, 1/12/2015    IMM INFLUENZA (1) 9/1/2017 10/12/2016, 1/16/2016, 1/12/2015, 11/5/2013, 1/10/2013    COLON CANCER SCREENING ANNUAL FIT 10/25/2017 10/25/2016    RETINAL SCREENING 12/22/2017 12/22/2016, 2/13/2015, 1/29/2015    A1C SCREENING 1/13/2018 7/13/2017, 4/3/2017, 11/2/2016, 8/4/2016, 11/20/2015, 7/1/2015    FASTING LIPID PROFILE 4/3/2018 4/3/2017, 11/2/2016, 8/4/2016, 4/13/2016, 9/21/2015, 7/1/2015    URINE ACR / MICROALBUMIN 7/13/2018 7/13/2017, 8/4/2016    SERUM CREATININE 7/13/2018 7/13/2017, 9/2/2016, 8/4/2016, 1/13/2016, 11/30/2015, 11/29/2015,  11/28/2015, 11/28/2015, 11/27/2015, 11/26/2015, 11/25/2015, 11/24/2015, 11/20/2015, 10/29/2015, 9/21/2015, 9/20/2015, 7/1/2015    IMM DTaP/Tdap/Td Vaccine (2 - Td) 5/23/2024 5/23/2014            Current Immunizations     13-VALENT PCV PREVNAR 1/16/2016 10:41 AM    Influenza TIV (IM) 11/5/2013, 1/10/2013    Influenza Vaccine Adult HD 10/12/2016, 1/16/2016 10:43 AM, 1/12/2015    Pneumococcal polysaccharide vaccine (PPSV-23) 7/14/2017    SHINGLES VACCINE 5/23/2014    Tdap Vaccine 5/23/2014      Below and/or attached are the medications your provider expects you to take. Review all of your home medications and newly ordered medications with your provider and/or pharmacist. Follow medication instructions as directed by your provider and/or pharmacist. Please keep your medication list with you and share with your provider. Update the information when medications are discontinued, doses are changed, or new medications (including over-the-counter products) are added; and carry medication information at all times in the event of emergency situations     Allergies:  STATINS - Unspecified               Medications  Valid as of: July 14, 2017 -  8:29 AM    Generic Name Brand Name Tablet Size Instructions for use    Aspirin (Tab) aspirin 81 MG Take 81 mg by mouth every morning.        Blood Glucose Monitoring Suppl (Misc) Blood Glucose Monitoring Suppl SUPPLIES Test strips order: Test strips for tania contour meter. Sig: use tid        Coenzyme Q10   Take 300 mg by mouth every day.        Cyanocobalamin (Tab) VITAMIN B-12 100 MCG Take 100 mcg by mouth every day.        Evolocumab (Solution Auto-injector) Evolocumab (REPATHA) 140 MG/ML Inject 1 Each as instructed every 14 days.        Glimepiride (Tab) AMARYL 4 MG Take 1 Tab by mouth 2 times a day.        Glucose Blood (Strip) TANIA CONTOUR NEXT TEST  TEST THREE TIMES A DAY FOR UNCONTROLLED SUGAR LEVELS        HydroCHLOROthiazide (Tab) HYDRODIURIL 12.5 MG Take 1 Tab by mouth  every day.        Insulin Glargine (Solution Pen-injector) LANTUS 100 UNIT/ML Inject 30 Units as instructed every evening.        Insulin Pen Needle (Misc) Insulin Pen Needle 31G X 5 MM 1 Each by Does not apply route every day.        Lisinopril (Tab) PRINIVIL 20 MG Take 1 Tab by mouth 2 times a day.        MetFORMIN HCl (Tab) GLUCOPHAGE 1000 MG Take 1 Tab by mouth 2 times a day, with meals.        Metoprolol Tartrate (Tab) LOPRESSOR 25 MG Take 1 Tab by mouth 2 Times a Day.        Omeprazole (CAPSULE DELAYED RELEASE) PRILOSEC 20 MG Take 1 Cap by mouth every day.        .                 Medicines prescribed today were sent to:     [facility]/PHARMACY #[id number] - [place], [place] - [address]    [address] [place] [address]    Phone: [phone] Fax: [phone]    Open 24 Hours?: No      Medication refill instructions:       If your prescription bottle indicates you have medication refills left, it is not necessary to call your provider’s office. Please contact your pharmacy and they will refill your medication.    If your prescription bottle indicates you do not have any refills left, you may request refills at any time through one of the following ways: The online [employer] system (except Urgent Care), by calling your provider’s office, or by asking your pharmacy to contact your provider’s office with a refill request. Medication refills are processed only during regular business hours and may not be available until the next business day. Your provider may request additional information or to have a follow-up visit with you prior to refilling your medication.   *Please Note: Medication refills are assigned a new Rx number when refilled electronically. Your pharmacy may indicate that no refills were authorized even though a new prescription for the same medication is available at the pharmacy. Please request the medicine by name with the pharmacy before contacting your provider for a refill.           [employer]  Code: Activation code not generated  Current MyChart Status: Active

## 2017-07-31 DIAGNOSIS — K21.9 GASTROESOPHAGEAL REFLUX DISEASE WITHOUT ESOPHAGITIS: ICD-10-CM

## 2017-07-31 RX ORDER — OMEPRAZOLE 20 MG/1
20 CAPSULE, DELAYED RELEASE ORAL DAILY
Qty: 90 CAP | Refills: 3 | Status: SHIPPED | OUTPATIENT
Start: 2017-07-31 | End: 2018-07-05 | Stop reason: SDUPTHER

## 2017-08-04 ENCOUNTER — OFFICE VISIT (OUTPATIENT)
Dept: CARDIOLOGY | Facility: MEDICAL CENTER | Age: 67
End: 2017-08-04
Payer: MEDICARE

## 2017-08-04 VITALS
HEART RATE: 79 BPM | OXYGEN SATURATION: 94 % | SYSTOLIC BLOOD PRESSURE: 124 MMHG | BODY MASS INDEX: 33.59 KG/M2 | HEIGHT: 72 IN | WEIGHT: 248 LBS | DIASTOLIC BLOOD PRESSURE: 70 MMHG

## 2017-08-04 DIAGNOSIS — I44.2 AV BLOCK, 3RD DEGREE (HCC): ICD-10-CM

## 2017-08-04 DIAGNOSIS — Z95.0 CARDIAC PACEMAKER IN SITU: ICD-10-CM

## 2017-08-04 DIAGNOSIS — Z95.3 HISTORY OF HEART VALVE REPLACEMENT WITH BIOPROSTHETIC VALVE: ICD-10-CM

## 2017-08-04 DIAGNOSIS — Z95.1 S/P CABG X 1: ICD-10-CM

## 2017-08-04 PROCEDURE — 93280 PM DEVICE PROGR EVAL DUAL: CPT | Performed by: INTERNAL MEDICINE

## 2017-08-04 PROCEDURE — 99204 OFFICE O/P NEW MOD 45 MIN: CPT | Mod: 25 | Performed by: INTERNAL MEDICINE

## 2017-08-04 RX ORDER — HYDROCHLOROTHIAZIDE 12.5 MG/1
CAPSULE, GELATIN COATED ORAL DAILY
Refills: 3 | COMMUNITY
Start: 2017-07-20 | End: 2017-10-15 | Stop reason: SDUPTHER

## 2017-08-04 NOTE — MR AVS SNAPSHOT
"        Bruno Lorea   2017 9:40 AM   Office Visit   MRN: [id number]    Department:  [facility]   Dept Phone:  [phone]    Description:  Male : 1950   Provider:  Jonathan Ray M.D.           Reason for Visit     Follow-Up           Allergies as of 2017     Allergen Noted Reactions    Statins [Hmg-Coa-R Inhibitors] 01/10/2013   Unspecified    Muscle cramps/pain, gout  Pt has tried several different ones and all with problems  EAO=0301      You were diagnosed with     Cardiac pacemaker in situ   [V45.01.ICD-9-CM]         Vital Signs     Blood Pressure Pulse Height Weight Body Mass Index Oxygen Saturation    124/70 mmHg 79 1.829 m (6' 0.01\") 112.492 kg (248 lb) 33.63 kg/m2 94%    Smoking Status                   Former Smoker           Basic Information     Date Of Birth Sex Race Ethnicity Preferred Language    1950 Male White Non- English      Your appointments     Aug 05, 2017 10:15 AM   ECHO with ECHO [facility], [facility] EXAM 9   ECHOCARDIOLOGY [facility] ([facility])    [address] [address]   [phone]            Oct 03, 2017  9:00 AM   Follow up Family Lipid Clinic with [facility] EXAM 4   [facility]  (--)    [address] [address]   [phone]            Oct 03, 2017  9:40 AM   Established Patient with Cezar Farias M.D.   [facility] [address]    [address]  [address]  [facility] [address]   [phone]           You will be receiving a confirmation call a few days before your appointment from our automated call confirmation system.              Problem List              ICD-10-CM Priority Class Noted - Resolved    Essential hypertension I10 Medium  1/10/2013 - Present    Gastroesophageal reflux disease without esophagitis K21.9 Low  1/10/2013 - Present    Vitamin B12 deficiency E53.8 Low  1/10/2013 - Present    Erectile dysfunction due to arterial insufficiency N52.01 Low  1/10/2013 " - Present    Dyslipidemia E78.5 Medium  1/12/2015 - Present    Aortic stenosis, severe I35.0 Medium  10/28/2015 - Present    AV block, 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) I44.2 High  11/29/2015 - Present    S/P CABG x 1 Z95.1 Medium  11/29/2015 - Present    H/O aortic valve replacement Z95.2 Medium  11/29/2015 - Present    Controlled type 2 diabetes mellitus without complication, without long-term current use of insulin (CMS-HCC) E11.9 Medium  11/29/2015 - Present    History of heart valve replacement with bioprosthetic valve [Z95.4] Z95.4 Medium  12/1/2015 - Present    Cardiac pacemaker in situ Z95.0 Medium  12/17/2015 - Present    Non morbid obesity due to excess calories E66.09   8/1/2016 - Present    Ulnar neuropathy of right upper extremity G56.21   8/1/2016 - Present    Coronary artery disease involving native heart without angina pectoris I25.10   8/1/2016 - Present    Rash R21   4/18/2017 - Present    Mixed hyperlipidemia E78.2   4/18/2017 - Present      Health Maintenance        Date Due Completion Dates    DIABETES MONOFILAMENT / LE EXAM 8/1/2017 8/1/2016, 1/12/2015    IMM INFLUENZA (1) 9/1/2017 10/12/2016, 1/16/2016, 1/12/2015, 11/5/2013, 1/10/2013    COLON CANCER SCREENING ANNUAL FIT 10/25/2017 10/25/2016    RETINAL SCREENING 12/22/2017 12/22/2016, 2/13/2015, 1/29/2015    A1C SCREENING 1/13/2018 7/13/2017, 4/3/2017, 11/2/2016, 8/4/2016, 11/20/2015, 7/1/2015    FASTING LIPID PROFILE 4/3/2018 4/3/2017, 11/2/2016, 8/4/2016, 4/13/2016, 9/21/2015, 7/1/2015    URINE ACR / MICROALBUMIN 7/13/2018 7/13/2017, 8/4/2016    SERUM CREATININE 7/13/2018 7/13/2017, 9/2/2016, 8/4/2016, 1/13/2016, 11/30/2015, 11/29/2015, 11/28/2015, 11/28/2015, 11/27/2015, 11/26/2015, 11/25/2015, 11/24/2015, 11/20/2015, 10/29/2015, 9/21/2015, 9/20/2015, 7/1/2015    IMM DTaP/Tdap/Td Vaccine (2 - Td) 5/23/2024 5/23/2014            Current Immunizations     13-VALENT PCV PREVNAR 1/16/2016 10:41 AM    Influenza TIV (IM) 11/5/2013, 1/10/2013    Influenza  Vaccine Adult HD 10/12/2016, 1/16/2016 10:43 AM, 1/12/2015    Pneumococcal polysaccharide vaccine (PPSV-23) 7/14/2017    SHINGLES VACCINE 5/23/2014    Tdap Vaccine 5/23/2014      Below and/or attached are the medications your provider expects you to take. Review all of your home medications and newly ordered medications with your provider and/or pharmacist. Follow medication instructions as directed by your provider and/or pharmacist. Please keep your medication list with you and share with your provider. Update the information when medications are discontinued, doses are changed, or new medications (including over-the-counter products) are added; and carry medication information at all times in the event of emergency situations     Allergies:  STATINS - Unspecified               Medications  Valid as of: August 04, 2017 - 10:28 AM    Generic Name Brand Name Tablet Size Instructions for use    Aspirin (Tab) aspirin 81 MG Take 81 mg by mouth every morning.        Blood Glucose Monitoring Suppl (Misc) Blood Glucose Monitoring Suppl SUPPLIES Test strips order: Test strips for tania contour meter. Sig: use tid        Coenzyme Q10   Take 300 mg by mouth every day.        Cyanocobalamin (Tab) VITAMIN B-12 100 MCG Take 100 mcg by mouth every day.        Evolocumab (Solution Auto-injector) Evolocumab (REPATHA) 140 MG/ML Inject 1 Each as instructed every 14 days.        Glimepiride (Tab) AMARYL 4 MG Take 1 Tab by mouth 2 times a day.        Glucose Blood (Strip) TANIA CONTOUR NEXT TEST  TEST THREE TIMES A DAY FOR UNCONTROLLED SUGAR LEVELS        HydroCHLOROthiazide (Tab) HYDRODIURIL 12.5 MG Take 1 Tab by mouth every day.        HydroCHLOROthiazide (Cap) MICROZIDE 12.5 MG Take  by mouth every day.        Insulin Glargine (Solution Pen-injector) LANTUS 100 UNIT/ML Inject 30 Units as instructed every evening.        Insulin Pen Needle (Misc) Insulin Pen Needle 31G X 5 MM 1 Each by Does not apply route every day.         Lisinopril (Tab) PRINIVIL 20 MG Take 1 Tab by mouth 2 times a day.        MetFORMIN HCl (Tab) GLUCOPHAGE 1000 MG Take 1 Tab by mouth 2 times a day, with meals.        Metoprolol Tartrate (Tab) LOPRESSOR 25 MG Take 1 Tab by mouth 2 Times a Day.        Omeprazole (CAPSULE DELAYED RELEASE) PRILOSEC 20 MG Take 1 Cap by mouth every day.        .                 Medicines prescribed today were sent to:     [id number] - [place], [place] - [address]    [address] [place] [address]    Phone: [phone] Fax: [phone]    Open 24 Hours?: No      Medication refill instructions:       If your prescription bottle indicates you have medication refills left, it is not necessary to call your provider’s office. Please contact your pharmacy and they will refill your medication.    If your prescription bottle indicates you do not have any refills left, you may request refills at any time through one of the following ways: The online [employer] system (except Urgent Care), by calling your provider’s office, or by asking your pharmacy to contact your provider’s office with a refill request. Medication refills are processed only during regular business hours and may not be available until the next business day. Your provider may request additional information or to have a follow-up visit with you prior to refilling your medication.   *Please Note: Medication refills are assigned a new Rx number when refilled electronically. Your pharmacy may indicate that no refills were authorized even though a new prescription for the same medication is available at the pharmacy. Please request the medicine by name with the pharmacy before contacting your provider for a refill.        Your To Do List     Future Labs/Procedures Complete By Expires    Echocardiogram Comp w/ Cont  As directed 8/4/2018         [employer] Access Code: Activation code not generated  Current [employer] Status: Active

## 2017-08-04 NOTE — PROGRESS NOTES
"Arrhythmia Clinic Note (New patient)     DOS: 8/4/2017    Referring physician: Carloz Mcallister    Chief complaint/Reason for consult: \"Consideration of CRT-P upgrade\"    HPI:  Patient is a 68 yo WM with history of severe AS s/p AVR with bioprosthetic valve complicated by post operative AV block requiring PPM. He was recently seen in cardiology clinic and found to have high degree of RV pacing. His AV delay was adjusted to try to minimize this. He was referred for possible upgrade to CRT-P. He denies any HF symptoms. He says he is otherwise doing well. Has some discomfort occasionally in PPM pocket but does not bother him enough to want to have anything invasive done.    ROS (+ highlighted in red):  Constitutional: Fevers/chills/fatigue/weightloss  HEENT: Blurry vision/eye pain/sore throat/hearing loss  Respiratory: Shortness of breath/cough  Cardiovascular: Chest pain/palpitations/edema/orthopnea/syncope  GI: Nausea/vomitting/diarrhea  MSK: Arthralgias/myagias/muscle weakness  Skin: Rash/sores  Neurological: Numbness/tremors/vertigo  Endocrine: Excessive thirst/polyuria/cold intolerance/heat intolerance  Psych: Depression/anxiety    Past Medical History   Diagnosis Date   • PHN (postherpetic neuralgia)    • Hypertension    • GERD (gastroesophageal reflux disease)    • Vitamin B12 deficiency 1/10/2013   • ED (erectile dysfunction) 1/10/2013   • Active smoker 1/12/2015   • Hyperlipidemia 1/12/2015   • Statin intolerance 1/12/2015   • TIA (transient ischemic attack) 09-   • Complete heart block (CMS-[facility]) 11/28/2015   • Aortic stenosis      11/23/15: s/p aortic valve replacement (23 mm [employer] Trifecta pericardial    • CAD (coronary artery disease)      11/23/15: coronary artery bypass grafting x1 (reverse saphenous vein graft to    • Indigestion    • Arthritis      shoulders/hips/hands/back   • Diabetes (CMS-[facility])      oral and insulin and victoza   • Pacemaker    • Heart valve disease      has replacement valve "   • TIA 09/2015     no deficits   • Dental disorder      upper denture   • Pain 01-13-16     right finger & wrist   • Heart burn    • Pain 9/2/16     right hand   • S/P CABG x 1 11/29/2015     SVG-RCA at the time of AVR 11/2015        Past Surgical History   Procedure Laterality Date   • Cholecystectomy  2011   • Hernia repair       umblical   • Recovery  11/2/2015     Procedure: CATH LAB-[facility] W/COROS [id number] STENOSIS I35.0;  Surgeon: Harjinder Surgery;  [place] [facility] [facility];  Service:    • Aortic valve replacement  11/23/2015     Procedure: AORTIC VALVE REPLACEMENT WITH LAURENCE ;  Surgeon: Ila Woods M.D.;  Location: SURGERY [facility];  Service:    • Multiple coronary artery bypass endo vein harvest  11/23/2015     Procedure: MULTIPLE CORONARY ARTERY BYPASS ENDO VEIN HARVEST X1;  Surgeon: Ila Woods M.D.;  Location: SURGERY [facility];  Service:    • Pacemaker insertion  11-29-15   • Recovery  1/15/2016     Procedure: CATH LAB PM REVISION MEDTRONIC RAMIREZ;  Surgeon: Harjinder Surgery;  Location[place] SURG[place] [facility];  Service:    • Recovery  8/29/2016     Procedure: FLUORO-Cervical CT [id number]-[id number] SEDATION;  Surgeon: Harjinder Surgery;  Location: SURGERY PRE[place] [facility];  Service:    • Cubital tunnel release Right 9/7/2016     Procedure: CUBITAL TUNNEL RELEASE W/ SUBCUTANEOUS TRANSPOSITION;  Surgeon: Krzysztof Langley M.D.;  Location: SURGERY [facility];  Service:        Social History     Social History   • Marital Status:      Spouse Name: N/A   • Number of Children: N/A   • Years of Education: N/A     Occupational History   • Not on file.     Social History Main Topics   • Smoking status: Former Smoker -- 0.50 packs/day for 8 years     Types: Cigarettes     Quit date: 08/01/2015   • Smokeless tobacco: Never Used   • Alcohol Use: 1.2 oz/week     2 Standard drinks or equivalent per week   • Drug Use: No   •  Sexual Activity:     Partners: Female     Other Topics Concern   • Not on file     Social History Narrative       Family History   Problem Relation Age of Onset   • Diabetes Mother    • Hypertension Neg Hx    • Hyperlipidemia Neg Hx    • Cancer Neg Hx        Allergies   Allergen Reactions   • Statins [Hmg-Coa-R Inhibitors] Unspecified     Muscle cramps/pain, gout  Pt has tried several different ones and all with problems  VOC=[id number]       Current Outpatient Prescriptions   Medication Sig Dispense Refill   • omeprazole (PRILOSEC) 20 MG delayed-release capsule Take 1 Cap by mouth every day. 90 Cap 3   • metformin (GLUCOPHAGE) 1000 MG tablet Take 1 Tab by mouth 2 times a day, with meals. 180 Tab 1   • glimepiride (AMARYL) 4 MG Tab Take 1 Tab by mouth 2 times a day. 180 Tab 3   • insulin glargine (LANTUS) 100 UNIT/ML Solution Pen-injector injection Inject 30 Units as instructed every evening. 5 PEN 11   • Evolocumab, REPATHA, 140 MG/ML Solution Auto-injector Inject 1 Each as instructed every 14 days. 2 PEN 11   • metoprolol (LOPRESSOR) 25 MG Tab Take 1 Tab by mouth 2 Times a Day. 180 Tab 3   • Blood Glucose Monitoring Suppl SUPPLIES Misc Test strips order: Test strips for tania contour meter. Sig: use tid 100 Each 11   • lisinopril (PRINIVIL) 20 MG Tab Take 1 Tab by mouth 2 times a day. 30 Tab 11   • Insulin Pen Needle (B-D UF III MINI PEN NEEDLES) 31G X 5 MM Misc 1 Each by Does not apply route every day. 100 Each 11   • TANIA CONTOUR NEXT TEST strip TEST THREE TIMES A DAY FOR UNCONTROLLED SUGAR LEVELS 200 Strip 11   • hydrochlorothiazide (HYDRODIURIL) 12.5 MG tablet Take 1 Tab by mouth every day. 90 Tab 3   • aspirin 81 MG tablet Take 81 mg by mouth every morning.     • Coenzyme Q10 (CO Q 10 PO) Take 300 mg by mouth every day.     • cyanocobalamin (VITAMIN B-12) 100 MCG TABS Take 100 mcg by mouth every day.     • hydrochlorothiazide (MICROZIDE) 12.5 MG capsule Take  by mouth every day.  3     No current  "facility-administered medications for this visit.       Physical Exam:  Filed Vitals:    08/04/17 0923   BP: 124/70   Pulse: 79   Height: 1.829 m (6' 0.01\")   Weight: 112.492 kg (248 lb)   SpO2: 94%     General appearance: NAD, conversant   Eyes: anicteric sclerae, moist conjunctivae; no lid-lag; PERRLA  HENT: Atraumatic; oropharynx clear with moist mucous membranes and no mucosal ulcerations; normal hard and soft palate  Neck: Trachea midline; FROM, supple, no thyromegaly or lymphadenopathy  Lungs: CTA, with normal respiratory effort and no intercostal retractions  CV: RRR, no MRGs, no JVD   Abdomen: Soft, non-tender; no masses or HSM  Extremities: No peripheral edema or extremity lymphadenopathy  Skin: Normal temperature, turgor and texture; no rash, ulcers or subcutaneous nodules  Psych: Appropriate affect, alert and oriented to person, place and time    Data:  Labs reviewed    Prior echo/stress results reviewed:  Preserved LV function    Impression/Plan:  1. Cardiac pacemaker in situ  Echocardiogram Comp w/ Cont   2. AV block, 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)     3. History of heart valve replacement with bioprosthetic valve [Z95.4]     4. S/P CABG x 1       -I have reviewed the PPM interrogation and he is pacing quite a bit ~50% in the RV  -Not surprising since he was implanted for AV block  -However his LV function is normal on last check and he does not exhibit any new HF sx  -I will re-check his echo  -If LVEF is preserved, no indication for CS lead  -I have further extended his AV delay to minimize RV pacing    Jonathan Ray MD      "

## 2017-08-05 ENCOUNTER — HOSPITAL ENCOUNTER (OUTPATIENT)
Dept: CARDIOLOGY | Facility: MEDICAL CENTER | Age: 67
End: 2017-08-05
Attending: INTERNAL MEDICINE
Payer: MEDICARE

## 2017-08-05 DIAGNOSIS — Z95.0 CARDIAC PACEMAKER IN SITU: ICD-10-CM

## 2017-08-05 PROCEDURE — 93306 TTE W/DOPPLER COMPLETE: CPT

## 2017-08-05 PROCEDURE — 93306 TTE W/DOPPLER COMPLETE: CPT | Mod: 26 | Performed by: INTERNAL MEDICINE

## 2017-08-07 LAB
LV EJECT FRACT  99904: 60
LV EJECT FRACT MOD 2C 99903: 62.34
LV EJECT FRACT MOD 4C 99902: 58.22
LV EJECT FRACT MOD BP 99901: 63.14

## 2017-08-29 ENCOUNTER — TELEPHONE (OUTPATIENT)
Dept: CARDIOLOGY | Facility: MEDICAL CENTER | Age: 67
End: 2017-08-29

## 2017-08-29 NOTE — TELEPHONE ENCOUNTER
----- Message from Jonathan Ray M.D. sent at 8/29/2017  4:20 PM PDT -----  Normal LV function on echo. No need for upgrade to BiV device. Can f/u as scheduled with Dr. Mcallister

## 2017-08-29 NOTE — TELEPHONE ENCOUNTER
Called pt. To advise. He is aware he is due for FV in April with Dr. UNDERWOOD and device check in Jan.

## 2017-09-28 ENCOUNTER — HOSPITAL ENCOUNTER (OUTPATIENT)
Dept: LAB | Facility: MEDICAL CENTER | Age: 67
End: 2017-09-28
Attending: NURSE PRACTITIONER
Payer: MEDICARE

## 2017-09-28 ENCOUNTER — HOSPITAL ENCOUNTER (OUTPATIENT)
Dept: LAB | Facility: MEDICAL CENTER | Age: 67
End: 2017-09-28
Attending: INTERNAL MEDICINE
Payer: MEDICARE

## 2017-09-28 DIAGNOSIS — E78.2 MIXED HYPERLIPIDEMIA: ICD-10-CM

## 2017-09-28 LAB
CHOLEST SERPL-MCNC: 99 MG/DL (ref 100–199)
HDLC SERPL-MCNC: 47 MG/DL
LDLC SERPL CALC-MCNC: 26 MG/DL
TRIGL SERPL-MCNC: 131 MG/DL (ref 0–149)

## 2017-09-28 PROCEDURE — 80061 LIPID PANEL: CPT

## 2017-09-28 PROCEDURE — 83704 LIPOPROTEIN BLD QUAN PART: CPT

## 2017-09-28 PROCEDURE — 36415 COLL VENOUS BLD VENIPUNCTURE: CPT

## 2017-10-03 ENCOUNTER — OFFICE VISIT (OUTPATIENT)
Dept: VASCULAR LAB | Facility: MEDICAL CENTER | Age: 67
End: 2017-10-03
Attending: INTERNAL MEDICINE
Payer: MEDICARE

## 2017-10-03 ENCOUNTER — OFFICE VISIT (OUTPATIENT)
Dept: MEDICAL GROUP | Facility: MEDICAL CENTER | Age: 67
End: 2017-10-03
Payer: MEDICARE

## 2017-10-03 VITALS
HEART RATE: 88 BPM | WEIGHT: 249.4 LBS | TEMPERATURE: 97.7 F | BODY MASS INDEX: 33.78 KG/M2 | RESPIRATION RATE: 16 BRPM | HEIGHT: 72 IN | DIASTOLIC BLOOD PRESSURE: 72 MMHG | SYSTOLIC BLOOD PRESSURE: 114 MMHG | OXYGEN SATURATION: 93 %

## 2017-10-03 DIAGNOSIS — Z95.3 HISTORY OF HEART VALVE REPLACEMENT WITH BIOPROSTHETIC VALVE: ICD-10-CM

## 2017-10-03 DIAGNOSIS — I44.2 AV BLOCK, 3RD DEGREE (HCC): ICD-10-CM

## 2017-10-03 DIAGNOSIS — E78.5 DYSLIPIDEMIA: ICD-10-CM

## 2017-10-03 DIAGNOSIS — I25.10 CORONARY ARTERY DISEASE INVOLVING NATIVE CORONARY ARTERY OF NATIVE HEART WITHOUT ANGINA PECTORIS: ICD-10-CM

## 2017-10-03 DIAGNOSIS — Z00.00 MEDICARE ANNUAL WELLNESS VISIT, SUBSEQUENT: ICD-10-CM

## 2017-10-03 DIAGNOSIS — I10 ESSENTIAL HYPERTENSION: ICD-10-CM

## 2017-10-03 DIAGNOSIS — Z11.59 NEED FOR HEPATITIS C SCREENING TEST: ICD-10-CM

## 2017-10-03 DIAGNOSIS — E66.09 NON MORBID OBESITY DUE TO EXCESS CALORIES: ICD-10-CM

## 2017-10-03 DIAGNOSIS — Z12.11 SCREEN FOR COLON CANCER: ICD-10-CM

## 2017-10-03 DIAGNOSIS — E78.5 HYPERLIPIDEMIA, UNSPECIFIED HYPERLIPIDEMIA TYPE: ICD-10-CM

## 2017-10-03 DIAGNOSIS — E11.9 CONTROLLED TYPE 2 DIABETES MELLITUS WITHOUT COMPLICATION, WITHOUT LONG-TERM CURRENT USE OF INSULIN (HCC): ICD-10-CM

## 2017-10-03 PROBLEM — E78.2 MIXED HYPERLIPIDEMIA: Status: RESOLVED | Noted: 2017-04-18 | Resolved: 2017-10-03

## 2017-10-03 PROCEDURE — 90662 IIV NO PRSV INCREASED AG IM: CPT

## 2017-10-03 PROCEDURE — G0439 PPPS, SUBSEQ VISIT: HCPCS | Mod: 25 | Performed by: INTERNAL MEDICINE

## 2017-10-03 PROCEDURE — 90471 IMMUNIZATION ADMIN: CPT

## 2017-10-03 PROCEDURE — 99212 OFFICE O/P EST SF 10 MIN: CPT | Mod: 25

## 2017-10-03 PROCEDURE — 99213 OFFICE O/P EST LOW 20 MIN: CPT | Mod: 25 | Performed by: INTERNAL MEDICINE

## 2017-10-03 ASSESSMENT — PATIENT HEALTH QUESTIONNAIRE - PHQ9: CLINICAL INTERPRETATION OF PHQ2 SCORE: 0

## 2017-10-03 NOTE — PROGRESS NOTES
CC: Follow-up multiple issues due for wellness exam.    HPI:   Bruno presents today with the following.    1. Medicare annual wellness visit, subsequent  Screenings performed below information given on advanced directives.    2. Controlled type 2 diabetes mellitus without complication, without long-term current use of insulin (CMS-HCC)  Patient not currently on his Victoza because of cancer reasons reporting fasting blood sugars one 170-180s persistently. Denying any hypo-glycemia    3. History of heart valve replacement with bioprosthetic valve [Z95.4]  Followed by cardiology denying any chest pains or palpitations.     4. AV block, 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)  Pacemaker and place followed by cardiology    5. Dyslipidemia  Maintain on injectable medications with excellent control cholesterol.    6. Non morbid obesity due to excess calories  Weight persistently elevated not currently eating a healthy diet nor activity regular basis.     7. Coronary artery disease involving native coronary artery of native heart without angina pectoris  Denying any chest pain.    8. Essential hypertension  Blood pressure well controlled    9. Screen for colon cancer      10. Need for hepatitis C screening test        Depression Screening    Little interest or pleasure in doing things?  0 - not at all  Feeling down, depressed , or hopeless? 0 - not at all  Patient Health Questionnaire Score: 0     If depressive symptoms identified deferred to follow up visit unless specifically addressed in assessment and plan.    Interpretation of PHQ-9 Total Score   Score Severity   1-4 No Depression   5-9 Mild Depression   10-14 Moderate Depression   15-19 Moderately Severe Depression   20-27 Severe Depression    Screening for Cognitive Impairment    Three Minute Recall (apple, watch, omid)  2/3    Draw clock face with all 12 numbers set to the hand to show 10 minutes past 11 o'clock  1 4/5  Cognitive concerns identified deferred for follow up unless  specifically addressed in assessment and plan.    Fall Risk Assessment    Has the patient had two or more falls in the last year or any fall with injury in the last year?  No    Safety Assessment    Throw rugs on floor.  Yes  Cautioned about securing or removing.    Handrails on all stairs.  No  Good lighting in all hallways.  Yes  Difficulty hearing.  No  Patient counseled about all safety risks that were identified.    Functional Assessment ADLs    Are there any barriers preventing you from cooking for yourself or meeting nutritional needs?  No.    Are there any barriers preventing you from driving safely or obtaining transportation?  No.    Are there any barriers preventing you from using a telephone or calling for help?  No.    Are there any barriers preventing you from shopping?  No.    Are there any barriers preventing you from taking care of your own finances?  No.    Are there any barriers preventing you from managing your medications?  No.    Are currently engaging any exercise or physical activity?  Yes.  Dog Walking, yard work    Health Maintenance Summary                DIABETES MONOFILAMENT / LE EXAM Overdue 8/1/2017      Done 8/1/2016 AMB DIABETIC MONOFILAMENT LOWER EXTREMITY EXAM     Patient has more history with this topic...    Annual Wellness Visit Overdue 8/2/2017      Done 8/1/2016 Visit Dx: Medicare annual wellness visit, subsequent     Patient has more history with this topic...    IMM INFLUENZA Overdue 9/1/2017      Done 10/12/2016 Imm Admin: Influenza Vaccine Adult HD     Patient has more history with this topic...    COLON CANCER SCREENING ANNUAL FIT Next Due 10/25/2017      Done 10/25/2016 OCCULT BLOOD FECES IMMUNOASSAY    RETINAL SCREENING Next Due 12/22/2017      Done 12/22/2016 REFERRAL FOR RETINAL SCREENING EXAM     Patient has more history with this topic...    A1C SCREENING Next Due 1/13/2018      Done 7/13/2017 HEMOGLOBIN A1C (A)     Patient has more history with this topic...     URINE ACR / MICROALBUMIN Next Due 7/13/2018      Done 7/13/2017 MICROALBUMIN CREAT RATIO URINE     Patient has more history with this topic...    SERUM CREATININE Next Due 7/13/2018      Done 7/13/2017 BASIC METABOLIC PANEL (A)     Patient has more history with this topic...    FASTING LIPID PROFILE Next Due 9/28/2018      Done 9/28/2017 LIPOPROTEIN QT BLOOD BY NMR     Patient has more history with this topic...    IMM DTaP/Tdap/Td Vaccine Next Due 5/23/2024      Done 5/23/2014 Imm Admin: Tdap Vaccine          Patient Care Team:  Cezar Farias M.D. as PCP - General (Internal Medicine)  Nicolas Collazo M.D. as Consulting Physician (Orthopaedics)  Carloz Mcallistre M.D. as Consulting Physician (Cardiology)  Russell Morris M.D. as Consulting Physician (Ophthalmology)  Yordan Padgett M.D. as Consulting Physician (Neurology)  Alex Sethi as Consulting Physician  MARIA C Urbano.P.R.N. as Consulting Physician (Cardiology)      Patient Active Problem List    Diagnosis Date Noted   • AV block, 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) 11/29/2015     Priority: High   • Cardiac pacemaker in situ 12/17/2015     Priority: Medium   • History of heart valve replacement with bioprosthetic valve [Z95.4] 12/01/2015     Priority: Medium   • S/P CABG x 1 11/29/2015     Priority: Medium   • H/O aortic valve replacement 11/29/2015     Priority: Medium   • Controlled type 2 diabetes mellitus without complication, without long-term current use of insulin (CMS-HCC) 11/29/2015     Priority: Medium   • Aortic stenosis, severe 10/28/2015     Priority: Medium   • Dyslipidemia 01/12/2015     Priority: Medium   • Essential hypertension 01/10/2013     Priority: Medium   • Gastroesophageal reflux disease without esophagitis 01/10/2013     Priority: Low   • Vitamin B12 deficiency 01/10/2013     Priority: Low   • Erectile dysfunction due to arterial insufficiency 01/10/2013     Priority: Low   • Rash 04/18/2017   • Non morbid obesity due to  excess calories 08/01/2016   • Coronary artery disease involving native heart without angina pectoris 08/01/2016       Current Outpatient Prescriptions   Medication Sig Dispense Refill   • hydrochlorothiazide (MICROZIDE) 12.5 MG capsule Take  by mouth every day.  3   • omeprazole (PRILOSEC) 20 MG delayed-release capsule Take 1 Cap by mouth every day. 90 Cap 3   • metformin (GLUCOPHAGE) 1000 MG tablet Take 1 Tab by mouth 2 times a day, with meals. 180 Tab 1   • glimepiride (AMARYL) 4 MG Tab Take 1 Tab by mouth 2 times a day. 180 Tab 3   • insulin glargine (LANTUS) 100 UNIT/ML Solution Pen-injector injection Inject 30 Units as instructed every evening. 5 PEN 11   • Evolocumab, REPATHA, 140 MG/ML Solution Auto-injector Inject 1 Each as instructed every 14 days. 2 PEN 11   • metoprolol (LOPRESSOR) 25 MG Tab Take 1 Tab by mouth 2 Times a Day. 180 Tab 3   • Blood Glucose Monitoring Suppl SUPPLIES Misc Test strips order: Test strips for tania contour meter. Sig: use tid 100 Each 11   • lisinopril (PRINIVIL) 20 MG Tab Take 1 Tab by mouth 2 times a day. 30 Tab 11   • Insulin Pen Needle (B-D UF III MINI PEN NEEDLES) 31G X 5 MM Misc 1 Each by Does not apply route every day. 100 Each 11   • TANIA CONTOUR NEXT TEST strip TEST THREE TIMES A DAY FOR UNCONTROLLED SUGAR LEVELS 200 Strip 11   • hydrochlorothiazide (HYDRODIURIL) 12.5 MG tablet Take 1 Tab by mouth every day. 90 Tab 3   • aspirin 81 MG tablet Take 81 mg by mouth every morning.     • Coenzyme Q10 (CO Q 10 PO) Take 300 mg by mouth every day.     • cyanocobalamin (VITAMIN B-12) 100 MCG TABS Take 100 mcg by mouth every day.       No current facility-administered medications for this visit.          Allergies as of 10/03/2017 - Reviewed 10/03/2017   Allergen Reaction Noted   • Statins [hmg-coa-r inhibitors] Unspecified 01/10/2013        ROS: As per HPI.    /72   Pulse 88   Temp 36.5 °C (97.7 °F)   Resp 16   Ht 1.829 m (6')   Wt 113.1 kg (249 lb 6.4 oz)   SpO2 93%    BMI 33.82 kg/m²     Physical Exam:  Gen:         Alert and oriented, No apparent distress.  Neck:        No Lymphadenopathy or Bruits.  Lungs:     Clear to auscultation bilaterally  CV:          Regular rate and rhythm. No murmurs, rubs or gallops.  Abd:         Soft non tender, non distended. Normal active bowel sounds.  No  Hepatosplenomegaly, No pulsatile masses.                   Ext:          No clubbing, cyanosis, edema.      Assessment and Plan.   67 y.o. male with the following issues.    1. Medicare annual wellness visit, subsequent  Discussed healthy lifestyle habits as well as screening regimens.  - Annual Wellness Visit - Includes PPPS Subsequent ()    2. Controlled type 2 diabetes mellitus without complication, without long-term current use of insulin (CMS-HCC)  Titrating insulin by 2 units every 3 days for blood sugars over 150. Will see back in 2 weeks have ordered A1c is it's too early to run now.  - Diabetic Monofilament Lower Extremity Exam  - HEMOGLOBIN A1C; Future  - HEMOGLOBIN A1C; Future    3. History of heart valve replacement with bioprosthetic valve [Z95.4]  Clinical stable follow cardiology  - Annual Wellness Visit - Includes PPPS Subsequent ()    4. AV block, 3rd degree ([facility])  Clinically stable continue pacemaker checks  - Annual Wellness Visit - Includes PPPS Subsequent ()    5. Dyslipidemia  Lipids currently well controlled. Discussed continued diet and exercise recheck 6 months to 1 year.  - Annual Wellness Visit - Includes PPPS Subsequent ()    6. Non morbid obesity due to excess calories  Discussed diet exercise and weight loss.  - Patient identified as having weight management issue.  Appropriate orders and counseling given.  - Annual Wellness Visit - Includes PPPS Subsequent ()    7. Coronary artery disease involving native coronary artery of native heart without angina pectoris  Asymptomatic follow-up cardiology  - Annual Wellness Visit - Includes  PPPS Subsequent ()    8. Essential hypertension  Currently well controlled, Discuss diet, exercise and salt restriction.  - Annual Wellness Visit - Includes PPPS Subsequent ()    9. Screen for colon cancer    - OCCULT BLOOD FECES IMMUNOASSAY; Future    10. Need for hepatitis C screening test    - HEP B SURFACE AB; Future  - HEP B SURFACE ANTIGEN; Future  - HEP B CORE AB IGM; Future  - HEP C VIRUS ANTIBODY; Future

## 2017-10-03 NOTE — PROGRESS NOTES
[facility]    Date of Service:  10/3/2017    Type of Visit:  FollowUp  He has been tolerating Repatha well, reports that the skin rash is mostly resolved, he did not go to the dermatologist but I recommended that he do so because he is in need of a skin cancer screening. His DM is not controlled and he will see Dr. Farias today.     Patient here for medical management of dyslipidemia.    History of Established ASCVD: Yes       Patient Active Problem List    Diagnosis Date Noted   • AV block, 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) 11/29/2015     Priority: High   • Cardiac pacemaker in situ 12/17/2015     Priority: Medium   • History of heart valve replacement with bioprosthetic valve [Z95.4] 12/01/2015     Priority: Medium   • S/P CABG x 1 11/29/2015     Priority: Medium   • H/O aortic valve replacement 11/29/2015     Priority: Medium   • Controlled type 2 diabetes mellitus without complication, without long-term current use of insulin (CMS-HCC) 11/29/2015     Priority: Medium   • Aortic stenosis, severe 10/28/2015     Priority: Medium   • Dyslipidemia 01/12/2015     Priority: Medium   • Essential hypertension 01/10/2013     Priority: Medium   • Gastroesophageal reflux disease without esophagitis 01/10/2013     Priority: Low   • Vitamin B12 deficiency 01/10/2013     Priority: Low   • Erectile dysfunction due to arterial insufficiency 01/10/2013     Priority: Low   • Rash 04/18/2017   • Mixed hyperlipidemia 04/18/2017   • Non morbid obesity due to excess calories 08/01/2016   • Ulnar neuropathy of right upper extremity 08/01/2016   • Coronary artery disease involving native heart without angina pectoris 08/01/2016       Baseline Lipids Prior to Treatment: (Unknown or unavailable/Shown Below)  - choose one  Lab Results   Component Value Date/Time     CHOLESTEROL, 08/04/2016 08:59 AM     LDL 80 08/04/2016 08:59 AM     HDL 52 08/04/2016 08:59 AM     TRIGLYCERIDES 155* 08/04/2016 08:59 AM        Most Recent Lipid Panel:   (  Lab Results   Component Value Date/Time    CHOLSTRLTOT 99 (L) 09/28/2017 06:54 AM    LDL 26 09/28/2017 06:54 AM    HDL 47 09/28/2017 06:54 AM    TRIGLYCERIDE 131 09/28/2017 06:54 AM       Lab Results   Component Value Date/Time    SODIUM 138 07/13/2017 07:20 AM    POTASSIUM 4.2 07/13/2017 07:20 AM    CHLORIDE 101 07/13/2017 07:20 AM    CO2 28 07/13/2017 07:20 AM    GLUCOSE 181 (H) 07/13/2017 07:20 AM    BUN 14 07/13/2017 07:20 AM    CREATININE 0.97 07/13/2017 07:20 AM     Lab Results   Component Value Date/Time    ALKPHOSPHAT 28 (L) 11/02/2016 08:35 AM    ASTSGOT 31 11/02/2016 08:35 AM    ALTSGPT 34 11/02/2016 08:35 AM    TBILIRUBIN 0.5 11/02/2016 08:35 AM        Current Prescription Lipid Lowering Medications - including dose:   Statin:  none    Non-Statin repatha     Current Outpatient Prescriptions on File Prior to Visit   Medication Sig Dispense Refill   • hydrochlorothiazide (MICROZIDE) 12.5 MG capsule Take  by mouth every day.  3   • omeprazole (PRILOSEC) 20 MG delayed-release capsule Take 1 Cap by mouth every day. 90 Cap 3   • metformin (GLUCOPHAGE) 1000 MG tablet Take 1 Tab by mouth 2 times a day, with meals. 180 Tab 1   • glimepiride (AMARYL) 4 MG Tab Take 1 Tab by mouth 2 times a day. 180 Tab 3   • insulin glargine (LANTUS) 100 UNIT/ML Solution Pen-injector injection Inject 30 Units as instructed every evening. 5 PEN 11   • Evolocumab, REPATHA, 140 MG/ML Solution Auto-injector Inject 1 Each as instructed every 14 days. 2 PEN 11   • metoprolol (LOPRESSOR) 25 MG Tab Take 1 Tab by mouth 2 Times a Day. 180 Tab 3   • Blood Glucose Monitoring Suppl SUPPLIES Misc Test strips order: Test strips for Kandu contour meter. Sig: use tid 100 Each 11   • lisinopril (PRINIVIL) 20 MG Tab Take 1 Tab by mouth 2 times a day. 30 Tab 11   • Insulin Pen Needle (B-D UF III MINI PEN NEEDLES) 31G X 5 MM Misc 1 Each by Does not apply route every day. 100 Each 11   • KARI CONTOUR NEXT TEST strip  TEST THREE TIMES A DAY FOR UNCONTROLLED SUGAR LEVELS 200 Strip 11   • hydrochlorothiazide (HYDRODIURIL) 12.5 MG tablet Take 1 Tab by mouth every day. 90 Tab 3   • aspirin 81 MG tablet Take 81 mg by mouth every morning.     • Coenzyme Q10 (CO Q 10 PO) Take 300 mg by mouth every day.     • cyanocobalamin (VITAMIN B-12) 100 MCG TABS Take 100 mcg by mouth every day.       No current facility-administered medications on file prior to visit.          Current Lipid Lowering and Related Supplements: Coq10     Current Adherence to Lipid Lowering Therapies (complete/partial/completely non-adherent)     Previously Attempted Interventions for Lipids - including outcome   He is statin intolerant to multiple statins.        ASSESSMENT AND PLAN  His DM is above goal, he will see Dr. Farias today. He many benefit from a DPP-4, SGLT-2 or meglitinide (for cost) as he had a c/c of weight gain on the RODRIGEZ.  As far as LDL levels he looks great and no changed needed today. I also gave him a flu shot today in clinic.       ACC/AHA Indication for Statin Therapy:    Established ASCVD    National Lipid Association (NLA) Goal (if applicable)   LDL-C  <70 mg/dl/   Non-HDL-C  <100 mg/dl/    Lifestyle Recommendations From Today’s Visit:   Continue with DM diet.     Indication for PCSK9 Inhibitor, if applicable        ASCVD with suboptimal control of LDL-C despite maximally tolerated statin continue with Repatha 140mg every two weeks.      Other Recommendations:     Blood Work Ordered At Today’s visit: lipids      Follow-Up: 1 year or PRN     Moy Marin, PharmD     Cc:  Jarrett     I am not able to close this note without using a 9999 for the LOS

## 2017-10-05 LAB
CHOLEST SERPL-MCNC: 121 MG/DL
HDL PARTICAL NO Q4363: ABNORMAL
HDL SIZE Q4361: 8.6 NM
HDLC SERPL-MCNC: 51 MG/DL (ref 40–59)
HLD.LARGE SERPL-SCNC: 3.5 UMOL/L
L VLDL PART NO Q4357: 5.6 NMOL/L
LDL SERPL QN: 20.4 NM
LDL SERPL-SCNC: 867 NMOL/L
LDL SMALL SERPL-SCNC: 477 NMOL/L
LDLC SERPL CALC-MCNC: 39 MG/DL
TRIGL SERPL-MCNC: 154 MG/DL (ref 30–149)
VLDL SIZE Q4362: ABNORMAL

## 2017-10-26 ENCOUNTER — HOSPITAL ENCOUNTER (OUTPATIENT)
Dept: LAB | Facility: MEDICAL CENTER | Age: 67
End: 2017-10-26
Attending: INTERNAL MEDICINE
Payer: MEDICARE

## 2017-10-26 ENCOUNTER — HOSPITAL ENCOUNTER (OUTPATIENT)
Facility: MEDICAL CENTER | Age: 67
End: 2017-10-26
Attending: INTERNAL MEDICINE
Payer: MEDICARE

## 2017-10-26 DIAGNOSIS — Z11.59 NEED FOR HEPATITIS C SCREENING TEST: ICD-10-CM

## 2017-10-26 DIAGNOSIS — E11.9 CONTROLLED TYPE 2 DIABETES MELLITUS WITHOUT COMPLICATION, WITHOUT LONG-TERM CURRENT USE OF INSULIN (HCC): ICD-10-CM

## 2017-10-26 LAB
EST. AVERAGE GLUCOSE BLD GHB EST-MCNC: 203 MG/DL
HBA1C MFR BLD: 8.7 % (ref 0–5.6)
HBV CORE IGM SER QL: NEGATIVE
HBV SURFACE AB SERPL IA-ACNC: <3.1 MIU/ML (ref 0–10)
HBV SURFACE AG SER QL: NEGATIVE
HCV AB SER QL: NEGATIVE

## 2017-10-26 PROCEDURE — 86706 HEP B SURFACE ANTIBODY: CPT

## 2017-10-26 PROCEDURE — 86705 HEP B CORE ANTIBODY IGM: CPT

## 2017-10-26 PROCEDURE — 83036 HEMOGLOBIN GLYCOSYLATED A1C: CPT

## 2017-10-26 PROCEDURE — 36415 COLL VENOUS BLD VENIPUNCTURE: CPT

## 2017-10-26 PROCEDURE — 86803 HEPATITIS C AB TEST: CPT

## 2017-10-26 PROCEDURE — 87340 HEPATITIS B SURFACE AG IA: CPT

## 2017-10-26 PROCEDURE — 82274 ASSAY TEST FOR BLOOD FECAL: CPT

## 2017-10-31 ENCOUNTER — OFFICE VISIT (OUTPATIENT)
Dept: MEDICAL GROUP | Facility: MEDICAL CENTER | Age: 67
End: 2017-10-31
Payer: MEDICARE

## 2017-10-31 VITALS
RESPIRATION RATE: 16 BRPM | SYSTOLIC BLOOD PRESSURE: 116 MMHG | DIASTOLIC BLOOD PRESSURE: 72 MMHG | HEART RATE: 74 BPM | BODY MASS INDEX: 33.94 KG/M2 | WEIGHT: 250.6 LBS | TEMPERATURE: 98.5 F | OXYGEN SATURATION: 94 % | HEIGHT: 72 IN

## 2017-10-31 DIAGNOSIS — E78.5 DYSLIPIDEMIA: ICD-10-CM

## 2017-10-31 DIAGNOSIS — E11.9 CONTROLLED TYPE 2 DIABETES MELLITUS WITHOUT COMPLICATION, WITHOUT LONG-TERM CURRENT USE OF INSULIN (HCC): ICD-10-CM

## 2017-10-31 DIAGNOSIS — I10 ESSENTIAL HYPERTENSION: ICD-10-CM

## 2017-10-31 PROCEDURE — 99214 OFFICE O/P EST MOD 30 MIN: CPT | Performed by: INTERNAL MEDICINE

## 2017-10-31 RX ORDER — GLIMEPIRIDE 4 MG/1
4 TABLET ORAL 2 TIMES DAILY
Qty: 180 TAB | Refills: 3 | Status: SHIPPED | OUTPATIENT
Start: 2017-10-31 | End: 2018-01-29

## 2017-10-31 NOTE — PROGRESS NOTES
CC: Follow-up multiple issues    HPI:   Bruno presents today with the following.    1. Controlled type 2 diabetes mellitus without complication, without long-term current use of insulin (CMS-HCC)  Presents after having blood work A1c's climbed slightly to 8.7. He is only taking Amaryl once a day as opposed to twice day as instructed. Lantus has gone up to 38 units denying any episodes of hypoglycemia.    2. Essential hypertension  Well-controlled denying any chest pain or shortness breath no edema.    3. Dyslipidemia  Maintain on injectable medications good control as last check.      Patient Active Problem List    Diagnosis Date Noted   • AV block, 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) 11/29/2015     Priority: High   • Cardiac pacemaker in situ 12/17/2015     Priority: Medium   • History of heart valve replacement with bioprosthetic valve [Z95.4] 12/01/2015     Priority: Medium   • S/P CABG x 1 11/29/2015     Priority: Medium   • H/O aortic valve replacement 11/29/2015     Priority: Medium   • Controlled type 2 diabetes mellitus without complication, without long-term current use of insulin (CMS-HCC) 11/29/2015     Priority: Medium   • Aortic stenosis, severe 10/28/2015     Priority: Medium   • Dyslipidemia 01/12/2015     Priority: Medium   • Essential hypertension 01/10/2013     Priority: Medium   • Gastroesophageal reflux disease without esophagitis 01/10/2013     Priority: Low   • Vitamin B12 deficiency 01/10/2013     Priority: Low   • Erectile dysfunction due to arterial insufficiency 01/10/2013     Priority: Low   • Rash 04/18/2017   • Non morbid obesity due to excess calories 08/01/2016   • Coronary artery disease involving native heart without angina pectoris 08/01/2016       Current Outpatient Prescriptions   Medication Sig Dispense Refill   • glimepiride (AMARYL) 4 MG Tab Take 1 Tab by mouth 2 times a day. 180 Tab 3   • insulin glargine (LANTUS) 100 UNIT/ML Solution Pen-injector injection Inject 42 Units as instructed every  evening. 5 PEN 11   • omeprazole (PRILOSEC) 20 MG delayed-release capsule Take 1 Cap by mouth every day. 90 Cap 3   • metformin (GLUCOPHAGE) 1000 MG tablet Take 1 Tab by mouth 2 times a day, with meals. 180 Tab 1   • Evolocumab, REPATHA, 140 MG/ML Solution Auto-injector Inject 1 Each as instructed every 14 days. 2 PEN 11   • metoprolol (LOPRESSOR) 25 MG Tab Take 1 Tab by mouth 2 Times a Day. 180 Tab 3   • Blood Glucose Monitoring Suppl SUPPLIES Misc Test strips order: Test strips for tania contour meter. Sig: use tid 100 Each 11   • lisinopril (PRINIVIL) 20 MG Tab Take 1 Tab by mouth 2 times a day. 30 Tab 11   • Insulin Pen Needle (B-D UF III MINI PEN NEEDLES) 31G X 5 MM Misc 1 Each by Does not apply route every day. 100 Each 11   • TANIA CONTOUR NEXT TEST strip TEST THREE TIMES A DAY FOR UNCONTROLLED SUGAR LEVELS 200 Strip 11   • hydrochlorothiazide (HYDRODIURIL) 12.5 MG tablet Take 1 Tab by mouth every day. 90 Tab 3   • aspirin 81 MG tablet Take 81 mg by mouth every morning.     • Coenzyme Q10 (CO Q 10 PO) Take 300 mg by mouth every day.     • cyanocobalamin (VITAMIN B-12) 100 MCG TABS Take 100 mcg by mouth every day.       No current facility-administered medications for this visit.          Allergies as of 10/31/2017 - Reviewed 10/31/2017   Allergen Reaction Noted   • Statins [hmg-coa-r inhibitors] Unspecified 01/10/2013        ROS: As per HPI.    /72   Pulse 74   Temp 36.9 °C (98.5 °F)   Resp 16   Ht 1.829 m (6')   Wt 113.7 kg (250 lb 9.6 oz)   SpO2 94%   BMI 33.99 kg/m²      Physical Exam:  Gen:         Alert and oriented, No apparent distress.  Neck:        No Lymphadenopathy or Bruits.  Lungs:     Clear to auscultation bilaterally  CV:          Regular rate and rhythm. No murmurs, rubs or gallops.               Ext:          No clubbing, cyanosis, edema.      Assessment and Plan.   67 y.o. male with the following issues.    1. Controlled type 2 diabetes mellitus without complication, without  long-term current use of insulin (CMS-[facility])  Increase Lantus to 42 units Amaryl 2 twice a day recheck in 3 months.  - glimepiride (AMARYL) 4 MG Tab; Take 1 Tab by mouth 2 times a day.  Dispense: 180 Tab; Refill: 3  - insulin glargine (LANTUS) 100 UNIT/ML Solution Pen-injector injection; Inject 42 Units as instructed every evening.  Dispense: 5 PEN; Refill: 11    2. Essential hypertension  Currently well controlled, Discuss diet, exercise and salt restriction.    3. Dyslipidemia  Lipids currently well controlled. Discussed continued diet and exercise recheck 6 months to 1 year.

## 2017-11-08 DIAGNOSIS — Z12.11 SCREEN FOR COLON CANCER: ICD-10-CM

## 2017-11-08 LAB — HEMOCCULT STL QL IA: NEGATIVE

## 2018-01-05 DIAGNOSIS — Z95.1 S/P CABG X 1: ICD-10-CM

## 2018-01-05 DIAGNOSIS — I10 ESSENTIAL HYPERTENSION: ICD-10-CM

## 2018-01-05 RX ORDER — LISINOPRIL 20 MG/1
20 TABLET ORAL 2 TIMES DAILY
Qty: 180 TAB | Refills: 2 | Status: SHIPPED | OUTPATIENT
Start: 2018-01-05 | End: 2018-10-04 | Stop reason: SDUPTHER

## 2018-01-10 ENCOUNTER — TELEPHONE (OUTPATIENT)
Dept: CARDIOLOGY | Facility: MEDICAL CENTER | Age: 68
End: 2018-01-10

## 2018-01-10 ENCOUNTER — NON-PROVIDER VISIT (OUTPATIENT)
Dept: CARDIOLOGY | Facility: MEDICAL CENTER | Age: 68
End: 2018-01-10
Payer: MEDICARE

## 2018-01-10 ENCOUNTER — TELEPHONE (OUTPATIENT)
Dept: MEDICAL GROUP | Facility: MEDICAL CENTER | Age: 68
End: 2018-01-10

## 2018-01-10 ENCOUNTER — OFFICE VISIT (OUTPATIENT)
Dept: CARDIOLOGY | Facility: MEDICAL CENTER | Age: 68
End: 2018-01-10
Payer: MEDICARE

## 2018-01-10 VITALS
SYSTOLIC BLOOD PRESSURE: 130 MMHG | OXYGEN SATURATION: 94 % | DIASTOLIC BLOOD PRESSURE: 80 MMHG | HEART RATE: 98 BPM | WEIGHT: 253 LBS | BODY MASS INDEX: 34.27 KG/M2 | HEIGHT: 72 IN

## 2018-01-10 DIAGNOSIS — E78.5 DYSLIPIDEMIA: ICD-10-CM

## 2018-01-10 DIAGNOSIS — I10 ESSENTIAL HYPERTENSION: ICD-10-CM

## 2018-01-10 DIAGNOSIS — I25.810 CORONARY ARTERY DISEASE INVOLVING CORONARY BYPASS GRAFT OF NATIVE HEART WITHOUT ANGINA PECTORIS: ICD-10-CM

## 2018-01-10 DIAGNOSIS — R00.2 PALPITATIONS: Primary | ICD-10-CM

## 2018-01-10 DIAGNOSIS — Z95.0 CARDIAC PACEMAKER IN SITU: ICD-10-CM

## 2018-01-10 DIAGNOSIS — Z95.1 S/P CABG X 1: ICD-10-CM

## 2018-01-10 DIAGNOSIS — I44.2 AV BLOCK, 3RD DEGREE (HCC): ICD-10-CM

## 2018-01-10 PROCEDURE — 93280 PM DEVICE PROGR EVAL DUAL: CPT | Performed by: INTERNAL MEDICINE

## 2018-01-10 PROCEDURE — 99214 OFFICE O/P EST MOD 30 MIN: CPT | Performed by: INTERNAL MEDICINE

## 2018-01-10 RX ORDER — PROPRANOLOL HYDROCHLORIDE 10 MG/1
10 TABLET ORAL 3 TIMES DAILY PRN
Qty: 30 TAB | Refills: 6 | Status: SHIPPED | OUTPATIENT
Start: 2018-01-10 | End: 2018-10-04 | Stop reason: SDUPTHER

## 2018-01-10 ASSESSMENT — ENCOUNTER SYMPTOMS: CARDIOVASCULAR NEGATIVE: 1

## 2018-01-10 NOTE — TELEPHONE ENCOUNTER
Pt seen in clinic today, he bought paper works for the patient assistance application for re-auth of his Repatha, paper works signed by Dr Mcallister and given to [employer]

## 2018-01-10 NOTE — TELEPHONE ENCOUNTER
Patient stopped by and is requesting to change his glimpiride back to Victoza now that he is out of the donut hole. Patient states his blood sugar is better controlled on Victoza. He was also wondering if he needs to taper before starting Victoza or if he can stop Glimepiride and start Victoza. Please advise.

## 2018-01-10 NOTE — PROGRESS NOTES
Subjective:   Bruno Loera is a 67 -year-old man with a history of severe aortic stenosis status post bioprosthetic surgical AVR in 11/2015, CAD status post single vessel CABG at that time (SVG-RCA), 3rd degree heart block status post pacemaker, former nicotine dependence, type 2 diabetes, hypertension, dyslipidemia, obesity, and statin intolerance.     He comes in today in the aftermath of an episode of palpitations. Device check revealed no significant arrhythmia at least at the detection thresholds corresponding to that episode. He did have one short episode of nonsustained supraventricular tachycardia in August.    He is doing well with his cardiac medications, and has no other cardiovascular complaints.    He does tell me that related to the cost of victoza, he had to periodically go back on Amaryl related to which he had quite a bit of weight gain (25 pounds).    Past Medical History:   asdfasdfads Date   • Active smoker 1/12/2015   • Aortic stenosis     11/23/15: s/p aortic valve replacement (23 mm [facility] Trifecta pericardial    • Arthritis     shoulders/hips/hands/back   • CAD (coronary artery disease)     11/23/15: coronary artery bypass grafting x1 (reverse saphenous vein graft to    • Complete heart block (CMS-HCC) 11/28/2015   • Dental disorder     upper denture   • Diabetes (CMS-HCC)     oral and insulin and victoza   • ED (erectile dysfunction) 1/10/2013   • GERD (gastroesophageal reflux disease)    • Heart burn    • Heart valve disease     has replacement valve   • Hyperlipidemia 1/12/2015   • Hypertension    • Indigestion    • Pacemaker    • Pain 01-13-16    right finger & wrist   • Pain 9/2/16    right hand   • PHN (postherpetic neuralgia)    • S/P CABG x 1 11/29/2015    SVG-RCA at the time of AVR 11/2015    • Statin intolerance 1/12/2015   • TIA 09/2015    no deficits   • TIA (transient ischemic attack) 09-   • Vitamin B12 deficiency 1/10/2013     Past Surgical History:   Procedure  Laterality Date   • AORTIC VALVE REPLACEMENT  11/23/2015    Procedure: AORTIC VALVE REPLACEMENT WITH LAURENCE ;  Surgeon: Ila Woods M.D.;  Location: SURGERY [facility];  Service:    • CHOLECYSTECTOMY  2011   • CUBITAL TUNNEL RELEASE Right 9/7/2016    Procedure: CUBITAL TUNNEL RELEASE W/ SUBCUTANEOUS TRANSPOSITION;  Surgeon: Krzysztof Langley M.D.;  Location: SURGERY [facility];  Service:    • HERNIA REPAIR      umblical   • MULTIPLE CORONARY ARTERY BYPASS ENDO VEIN HARVEST  11/23/2015    Procedure: MULTIPLE CORONARY ARTERY BYPASS ENDO VEIN HARVEST X1;  Surgeon: Ila Woods M.D.;  Location: SURGERY [facility];  Service:    • PACEMAKER INSERTION  11-29-15   • RECOVERY  11/2/2015    Procedure: CATH LAB-[facility] W/COROS [id number] STENOSIS I35.0;  Surgeon: Recoveryonlisa Surgery;  Location: SURGERY PRE-POST PROC UNIT [facility];  Service:    • RECOVERY  1/15/2016    Procedure: CATH LAB PM REVISION MEDTRONIC RAMIREZ;  Surgeon: Recoveryonlisa Surgery;  Location: SURGERY PRE-POST PROC UNIT [facility];  Service:    • RECOVERY  8/29/2016    Procedure: FLUORO-Cervical CT Xjnoscyvi-[id number]-SH SEDATION;  Surgeon: Recoveryonlisa Surgery;  Location: SURGERY PRE-POST PROC UNIT [facility];  Service:      Family History   Problem Relation Age of Onset   • Diabetes Mother    • Hypertension Neg Hx    • Hyperlipidemia Neg Hx    • Cancer Neg Hx      History   Smoking Status   • Former Smoker   • Packs/day: 0.50   • Years: 8.00   • Types: Cigarettes   • Quit date: 8/1/2015   Smokeless Tobacco   • Never Used     Allergies   Allergen Reactions   • Statins [Hmg-Coa-R Inhibitors] Unspecified     Muscle cramps/pain, gout  Pt has tried several different ones and all with problems  [id number]     Outpatient Encounter Prescriptions as of 1/10/2018   Medication Sig Dispense Refill   • propranolol (INDERAL) 10 MG Tab Take 1 Tab by mouth 3 times a day as needed (palpitations lasting more than 30 seconds). 30 Tab 6   • lisinopril (PRINIVIL) 20  MG Tab TAKE 1 TAB BY MOUTH 2 TIMES A  Tab 2   • metoprolol (LOPRESSOR) 25 MG Tab TAKE 1 TAB BY MOUTH 2 TIMES A DAY. 180 Tab 2   • glimepiride (AMARYL) 4 MG Tab Take 1 Tab by mouth 2 times a day. (Patient taking differently: Take 4 mg by mouth every morning.) 180 Tab 3   • insulin glargine (LANTUS) 100 UNIT/ML Solution Pen-injector injection Inject 42 Units as instructed every evening. 5 PEN 11   • omeprazole (PRILOSEC) 20 MG delayed-release capsule Take 1 Cap by mouth every day. 90 Cap 3   • metformin (GLUCOPHAGE) 1000 MG tablet Take 1 Tab by mouth 2 times a day, with meals. 180 Tab 1   • Evolocumab, REPATHA, 140 MG/ML Solution Auto-injector Inject 1 Each as instructed every 14 days. 2 PEN 11   • hydrochlorothiazide (HYDRODIURIL) 12.5 MG tablet Take 1 Tab by mouth every day. 90 Tab 3   • aspirin 81 MG tablet Take 81 mg by mouth every morning.     • Coenzyme Q10 (CO Q 10 PO) Take 300 mg by mouth every day.     • cyanocobalamin (VITAMIN B-12) 100 MCG TABS Take 100 mcg by mouth every day.     • metformin (GLUCOPHAGE) 1000 MG tablet TAKE 1 TAB BY MOUTH 2 TIMES A DAY, WITH MEALS. 180 Tab 1   • insulin glargine (BASAGLAR KWIKPEN) 100 UNIT/ML Solution Pen-injector injection Inject 42 Units as instructed every evening. (Patient not taking: Reported on 1/10/2018) 5 PEN 3   • Blood Glucose Monitoring Suppl SUPPLIES Misc Test strips order: Test strips for tania contour meter. Sig: use tid 100 Each 11   • Insulin Pen Needle (B-D UF III MINI PEN NEEDLES) 31G X 5 MM Misc 1 Each by Does not apply route every day. 100 Each 11   • TANIA CONTOUR NEXT TEST strip TEST THREE TIMES A DAY FOR UNCONTROLLED SUGAR LEVELS 200 Strip 11     No facility-administered encounter medications on file as of 1/10/2018.      Review of Systems   Constitutional:             Cardiovascular: Negative.         He relates a shocklike chest discomfort at the edge of his generator about every 3 months, which he is not concerned about too much   All other  systems reviewed and are negative.       Objective:   /80   Pulse 98   Ht 1.829 m (6')   Wt 114.8 kg (253 lb)   SpO2 94%   BMI 34.31 kg/m²     Physical Exam   Constitutional: He is oriented to person, place, and time. He appears well-developed and well-nourished. No distress.   Pleasant, middle-aged appearing man in no distress accompanied by his wife   HENT:   Head: Normocephalic and atraumatic.   Eyes: Conjunctivae and EOM are normal. Pupils are equal, round, and reactive to light. No scleral icterus.   Neck: Neck supple. No JVD present. No tracheal deviation present.   Cardiovascular: Normal rate, regular rhythm and intact distal pulses.  Exam reveals no gallop and no friction rub.    Murmur heard.   Crescendo decrescendo systolic murmur is present with a grade of 2/6   Pulses:       Dorsalis pedis pulses are 2+ on the right side, and 2+ on the left side.   No carotid bruits. Pacemaker generator noted with a well-healed incision and no signs of infection.   Pulmonary/Chest: Effort normal and breath sounds normal. No stridor. No respiratory distress. He has no wheezes. He has no rales.   Abdominal: Soft. Bowel sounds are normal. He exhibits no distension.   Musculoskeletal: He exhibits no edema.   Neurological: He is alert and oriented to person, place, and time.   There is no change in his sensation of numbness in his right fourth and fifth fingers with palpation of his ulnar nerve as it runs through the ulnar groove of the elbow. Nor was there change in sensation with turning his head in either direction and placing downward pressure on his bregma. Full exam deferred.   Skin: Skin is warm and dry. No rash noted. He is not diaphoretic. No erythema. No pallor.   Psychiatric: He has a normal mood and affect. Judgment and thought content normal.   Vitals reviewed.    Lab Results   Component Value Date/Time    WBC 6.4 08/04/2016 08:59 AM    RBC 5.59 08/04/2016 08:59 AM    HEMOGLOBIN 16.7 08/04/2016 08:59  "AM    HEMATOCRIT 51.3 08/04/2016 08:59 AM    MCV 91.8 08/04/2016 08:59 AM    MCH 29.9 08/04/2016 08:59 AM    MCHC 32.6 (L) 08/04/2016 08:59 AM    MPV 11.8 08/04/2016 08:59 AM        Lab Results   Component Value Date/Time    SODIUM 138 07/13/2017 07:20 AM    POTASSIUM 4.2 07/13/2017 07:20 AM    CHLORIDE 101 07/13/2017 07:20 AM    CO2 28 07/13/2017 07:20 AM    GLUCOSE 181 (H) 07/13/2017 07:20 AM    BUN 14 07/13/2017 07:20 AM    CREATININE 0.97 07/13/2017 07:20 AM        Lab Results   Component Value Date/Time    ASTSGOT 31 11/02/2016 08:35 AM    ALTSGPT 34 11/02/2016 08:35 AM        Lab Results   Component Value Date/Time    CHOLSTRLTOT 121 09/28/2017 06:55 AM    CHOLSTRLTOT 99 (L) 09/28/2017 06:54 AM    LDL 26 09/28/2017 06:54 AM    HDL 51 09/28/2017 06:55 AM    HDL 47 09/28/2017 06:54 AM    TRIGLYCERIDE 154 (H) 09/28/2017 06:55 AM    TRIGLYCERIDE 131 09/28/2017 06:54 AM       Echocardiogram, 1/28/2016:  \"CONCLUSIONS  Compared to the images of the study done 09/21/2015- there has been.   Mild concentric left ventricular hypertrophy.  Left ventricular ejection fraction is visually estimated to be 65%.  Normal left ventricular systolic function.  Grade I diastolic dysfunction.  Known bioprosthetic aortic valve that is functioning normally with normal transvalvular gradients.  Mild mitral regurgitation.  Mitral annular calcification.  Estimated right ventricular systolic pressure  is 30 mmHg\"    Echocardiogram, 8/5/2017:  \"CONCLUSIONS  Normal chamber sizes. Normal RV and LV systolic function. LVEF is 60% visually. There is grade I diastolic dysfunction. Normal functioning bioprosthetic aortic valve. No other significant vavular disease. RVSP estimated at 45 mmHg. IVC is not well visualized\"    Assessment:     1. Palpitations  propranolol (INDERAL) 10 MG Tab   2. Cardiac pacemaker in situ     3. AV block, 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)     4. Coronary artery disease involving coronary bypass graft of native heart without angina " pectoris     5. Essential hypertension     6. Dyslipidemia     7. S/P CABG x 1         Medical Decision Making:  Today's Assessment / Status / Plan:     He is doing overall quite well today. He did have one episode of palpitations and we checked his device. There is no corresponding arrhythmia, and his atrial and ventricular pacing is much less after electrophysiology reprogrammed it in the summer. I reviewed with him his lipid panel recently, and I think his lipids are wonderfully controlled on repair with an LDL of 26 mg/dL. His blood pressure is well controlled, and I have made no changes to his cardiovascular regimen outside of when necessary propranolol for episodes of palpitations.    Carloz Mcallister MD  Cardiologist, [facility] [place]     Return in about 6 months (around 7/10/2018).

## 2018-01-10 NOTE — TELEPHONE ENCOUNTER
[facility]   He can stop glimiperide and start victoza.   Have him let us know if getting nauseated on victoza.

## 2018-01-10 NOTE — LETTER
Name:          Bruno Loera   YOB: 1950  Date:     1/10/2018      Cezar Farias M.D.  [address]  [place] [address]     Carloz Mcallister MD  [address], [address]  [place] [address]  Phone: [phone]  Back Line: [phone]  Fax: [phone]  E-mail: [email or web address]   Dear Dr. Farias,    We had the pleasure of seeing your patient, Bruno Loera, in Cardiology Clinic at [facility] today.    As you know, he is a 67-year-old man with a history of severe aortic stenosis status post bioprosthetic surgical AVR in 11/2015, CAD status post single vessel CABG at that time (SVG-RCA), 3rd degree heart block status post pacemaker, former nicotine dependence, type 2 diabetes, hypertension, dyslipidemia, obesity, and statin intolerance.    He is doing overall quite well today. He did have one episode of palpitations and we checked his device. There is no corresponding arrhythmia, and his atrial and ventricular pacing is much less after electrophysiology reprogrammed it in the summer. I reviewed with him his lipid panel recently, and I think his lipids are wonderfully controlled on repair with an LDL of 26 mg/dL. His blood pressure is well controlled, and I have made no changes to his cardiovascular regimen outside of when necessary propranolol for episodes of palpitations.    Return in about 6 months (around 7/10/2018).    Thank you for the referral and please do not hesitate to contact me at any time. My contact information is listed above.    This note was dictated using Dragon speech recognition software.     A full note including my physical examination and a full list of rectified medications is available in our medical record, and can be faxed as well.    Carloz Mcallister MD  Cardiologist  [facility]

## 2018-01-18 DIAGNOSIS — E11.9 CONTROLLED TYPE 2 DIABETES MELLITUS WITHOUT COMPLICATION, WITHOUT LONG-TERM CURRENT USE OF INSULIN (HCC): ICD-10-CM

## 2018-01-18 NOTE — TELEPHONE ENCOUNTER
Patient called regarding his insulin he needed the lantus refilled instead could you send the lantus to [facility]  [place]

## 2018-01-29 ENCOUNTER — OFFICE VISIT (OUTPATIENT)
Dept: MEDICAL GROUP | Facility: MEDICAL CENTER | Age: 68
End: 2018-01-29
Payer: MEDICARE

## 2018-01-29 VITALS
HEART RATE: 86 BPM | RESPIRATION RATE: 16 BRPM | BODY MASS INDEX: 33.86 KG/M2 | WEIGHT: 250 LBS | OXYGEN SATURATION: 91 % | TEMPERATURE: 97.9 F | SYSTOLIC BLOOD PRESSURE: 120 MMHG | HEIGHT: 72 IN | DIASTOLIC BLOOD PRESSURE: 82 MMHG

## 2018-01-29 DIAGNOSIS — I10 ESSENTIAL HYPERTENSION: ICD-10-CM

## 2018-01-29 DIAGNOSIS — E78.5 DYSLIPIDEMIA: ICD-10-CM

## 2018-01-29 DIAGNOSIS — M25.551 PAIN OF RIGHT HIP JOINT: ICD-10-CM

## 2018-01-29 DIAGNOSIS — E11.9 CONTROLLED TYPE 2 DIABETES MELLITUS WITHOUT COMPLICATION, WITHOUT LONG-TERM CURRENT USE OF INSULIN (HCC): ICD-10-CM

## 2018-01-29 LAB
HBA1C MFR BLD: 8.3 % (ref ?–5.8)
INT CON NEG: NEGATIVE
INT CON POS: POSITIVE

## 2018-01-29 PROCEDURE — 83036 HEMOGLOBIN GLYCOSYLATED A1C: CPT | Performed by: INTERNAL MEDICINE

## 2018-01-29 PROCEDURE — 92250 FUNDUS PHOTOGRAPHY W/I&R: CPT | Mod: 26 | Performed by: INTERNAL MEDICINE

## 2018-01-29 PROCEDURE — 99214 OFFICE O/P EST MOD 30 MIN: CPT | Performed by: INTERNAL MEDICINE

## 2018-01-29 NOTE — PROGRESS NOTES
CC: Follow-up diabetes and hypertension complaining of hip pain.    HPI:   Bruno presents today with the following.    1. Controlled type 2 diabetes mellitus without complication, without long-term current use of insulin (CMS-HCC)  Presents now back on Victoza is stopping Amaryl. He reports his appetite is going down and his blood sugars are doing the same. A1c of 8.5 but only been on Victoza 3 weeks.    2. Pain of right hip joint  Is complaining of right hip pain no injury. Pain is mostly with palpation the area as well as laying directly on it at night.    3. Essential hypertension  Blood pressures been well controlled no changes in medication.    4. Dyslipidemia  Not yet due for check of cholesterol maintained on statin without myalgia      Patient Active Problem List    Diagnosis Date Noted   • AV block, 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) 11/29/2015     Priority: High   • Cardiac pacemaker in situ 12/17/2015     Priority: Medium   • History of heart valve replacement with bioprosthetic valve [Z95.4] 12/01/2015     Priority: Medium   • S/P CABG x 1 11/29/2015     Priority: Medium   • H/O aortic valve replacement 11/29/2015     Priority: Medium   • Controlled type 2 diabetes mellitus without complication, without long-term current use of insulin (CMS-HCC) 11/29/2015     Priority: Medium   • Aortic stenosis, severe 10/28/2015     Priority: Medium   • Dyslipidemia 01/12/2015     Priority: Medium   • Essential hypertension 01/10/2013     Priority: Medium   • Gastroesophageal reflux disease without esophagitis 01/10/2013     Priority: Low   • Vitamin B12 deficiency 01/10/2013     Priority: Low   • Erectile dysfunction due to arterial insufficiency 01/10/2013     Priority: Low   • Rash 04/18/2017   • Non morbid obesity due to excess calories 08/01/2016   • Coronary artery disease involving native heart without angina pectoris 08/01/2016       Current Outpatient Prescriptions   Medication Sig Dispense Refill   • insulin glargine  (LANTUS) 100 UNIT/ML Solution Pen-injector injection Inject 42 Units as instructed every evening. 5 PEN 11   • Insulin Pen Needle (B-D UF III MINI PEN NEEDLES) 31G X 5 MM Misc 1 Each by Does not apply route every day. 100 Each 11   • propranolol (INDERAL) 10 MG Tab Take 1 Tab by mouth 3 times a day as needed (palpitations lasting more than 30 seconds). 30 Tab 6   • liraglutide (VICTOZA) 18 MG/3ML Solution Pen-injector injection Inject 0.3 mL as instructed every day. 18 mL 3   • lisinopril (PRINIVIL) 20 MG Tab TAKE 1 TAB BY MOUTH 2 TIMES A  Tab 2   • metformin (GLUCOPHAGE) 1000 MG tablet TAKE 1 TAB BY MOUTH 2 TIMES A DAY, WITH MEALS. 180 Tab 1   • metoprolol (LOPRESSOR) 25 MG Tab TAKE 1 TAB BY MOUTH 2 TIMES A DAY. 180 Tab 2   • omeprazole (PRILOSEC) 20 MG delayed-release capsule Take 1 Cap by mouth every day. 90 Cap 3   • metformin (GLUCOPHAGE) 1000 MG tablet Take 1 Tab by mouth 2 times a day, with meals. 180 Tab 1   • Evolocumab, REPATHA, 140 MG/ML Solution Auto-injector Inject 1 Each as instructed every 14 days. 2 PEN 11   • Blood Glucose Monitoring Suppl SUPPLIES Misc Test strips order: Test strips for tania contour meter. Sig: use tid 100 Each 11   • TANIA CONTOUR NEXT TEST strip TEST THREE TIMES A DAY FOR UNCONTROLLED SUGAR LEVELS 200 Strip 11   • hydrochlorothiazide (HYDRODIURIL) 12.5 MG tablet Take 1 Tab by mouth every day. 90 Tab 3   • aspirin 81 MG tablet Take 81 mg by mouth every morning.     • Coenzyme Q10 (CO Q 10 PO) Take 300 mg by mouth every day.     • cyanocobalamin (VITAMIN B-12) 100 MCG TABS Take 100 mcg by mouth every day.       No current facility-administered medications for this visit.          Allergies as of 01/29/2018 - Reviewed 01/29/2018   Allergen Reaction Noted   • Statins [hmg-coa-r inhibitors] Unspecified 01/10/2013      ROS: Denies Chest pain, SOB, LE edema.    /82   Pulse 86   Temp 36.6 °C (97.9 °F)   Resp 16   Ht 1.829 m (6')   Wt 113.4 kg (250 lb)   SpO2 91%    BMI 33.91 kg/m²      Physical Exam:  Gen:         Alert and oriented, No apparent distress.  Neck:        No Lymphadenopathy or Bruits.  Lungs:     Clear to auscultation bilaterally  CV:          Regular rate and rhythm. No murmurs, rubs or gallops.               Ext:          No clubbing, cyanosis, edema.      Assessment and Plan.   68 y.o. male with the following issues.    1. Controlled type 2 diabetes mellitus without complication, without long-term current use of insulin (CMS-HCC)  Will see back in 3 months to recheck A1c in no changes to medication at this time as he is now titrating up on Victoza.  - POCT  A1C  - POCT Retinal Eye Exam    2. Pain of right hip joint  Symptoms consistent with possible bursitis have referred to sports medicine. Likely need to go up on his Lantus steroid injection.  - REFERRAL TO SPORTS MEDICINE    3. Essential hypertension  Currently well controlled, Discuss diet, exercise and salt restriction.    4. Dyslipidemia  Continue statin recheck 6 months.

## 2018-01-30 LAB — RETINAL SCREEN: NEGATIVE

## 2018-05-01 ENCOUNTER — OFFICE VISIT (OUTPATIENT)
Dept: MEDICAL GROUP | Facility: MEDICAL CENTER | Age: 68
End: 2018-05-01
Payer: MEDICARE

## 2018-05-01 VITALS
HEIGHT: 72 IN | TEMPERATURE: 98.8 F | OXYGEN SATURATION: 95 % | RESPIRATION RATE: 16 BRPM | SYSTOLIC BLOOD PRESSURE: 116 MMHG | DIASTOLIC BLOOD PRESSURE: 78 MMHG | WEIGHT: 245 LBS | BODY MASS INDEX: 33.18 KG/M2 | HEART RATE: 79 BPM

## 2018-05-01 DIAGNOSIS — I44.2 AV BLOCK, 3RD DEGREE (HCC): ICD-10-CM

## 2018-05-01 DIAGNOSIS — E11.9 CONTROLLED TYPE 2 DIABETES MELLITUS WITHOUT COMPLICATION, WITHOUT LONG-TERM CURRENT USE OF INSULIN (HCC): ICD-10-CM

## 2018-05-01 DIAGNOSIS — I10 ESSENTIAL HYPERTENSION: ICD-10-CM

## 2018-05-01 LAB
HBA1C MFR BLD: 7.5 % (ref ?–5.8)
INT CON NEG: NEGATIVE
INT CON POS: POSITIVE

## 2018-05-01 PROCEDURE — 99214 OFFICE O/P EST MOD 30 MIN: CPT | Performed by: INTERNAL MEDICINE

## 2018-05-01 PROCEDURE — 83036 HEMOGLOBIN GLYCOSYLATED A1C: CPT | Performed by: INTERNAL MEDICINE

## 2018-05-01 NOTE — PROGRESS NOTES
CC: Follow-up diabetes and hypertension.    HPI:   Bruno presents today with the following.    1. Controlled type 2 diabetes mellitus without complication, without long-term current use of insulin ([facility])  Presents A1c checked in office today has trended down to 7.5.  He is on Victoza not tolerating full dosage however.  He is still trying to work his way up.  Denying any hypoglycemia    2. AV block, 3rd degree ([facility])  Pacemaker in place denying any palpitations followed by cardiology.    3. Essential hypertension  Blood pressure well controlled    4. BMI 33.0-33.9,adult  Weight has persistently gone down since the first year since starting Victoza.      Patient Active Problem List    Diagnosis Date Noted   • AV block, 3rd degree ([facility]) 11/29/2015     Priority: High   • Cardiac pacemaker in situ 12/17/2015     Priority: Medium   • History of heart valve replacement with bioprosthetic valve [Z95.4] 12/01/2015     Priority: Medium   • S/P CABG x 1 11/29/2015     Priority: Medium   • H/O aortic valve replacement 11/29/2015     Priority: Medium   • Controlled type 2 diabetes mellitus without complication, without long-term current use of insulin ([facility]) 11/29/2015     Priority: Medium   • Aortic stenosis, severe 10/28/2015     Priority: Medium   • Dyslipidemia 01/12/2015     Priority: Medium   • Essential hypertension 01/10/2013     Priority: Medium   • Gastroesophageal reflux disease without esophagitis 01/10/2013     Priority: Low   • Vitamin B12 deficiency 01/10/2013     Priority: Low   • Erectile dysfunction due to arterial insufficiency 01/10/2013     Priority: Low   • Rash 04/18/2017   • Non morbid obesity due to excess calories 08/01/2016   • Coronary artery disease involving native heart without angina pectoris 08/01/2016       Current Outpatient Prescriptions   Medication Sig Dispense Refill   • insulin glargine (LANTUS) 100 UNIT/ML Solution Pen-injector injection Inject 42 Units as instructed every evening. 20 PEN 3    • Insulin Pen Needle (B-D UF III MINI PEN NEEDLES) 31G X 5 MM Misc 1 Each by Does not apply route 2 Times a Day. 200 Each 11   • propranolol (INDERAL) 10 MG Tab Take 1 Tab by mouth 3 times a day as needed (palpitations lasting more than 30 seconds). 30 Tab 6   • liraglutide (VICTOZA) 18 MG/3ML Solution Pen-injector injection Inject 0.3 mL as instructed every day. 18 mL 3   • lisinopril (PRINIVIL) 20 MG Tab TAKE 1 TAB BY MOUTH 2 TIMES A  Tab 2   • metoprolol (LOPRESSOR) 25 MG Tab TAKE 1 TAB BY MOUTH 2 TIMES A DAY. 180 Tab 2   • omeprazole (PRILOSEC) 20 MG delayed-release capsule Take 1 Cap by mouth every day. 90 Cap 3   • metformin (GLUCOPHAGE) 1000 MG tablet Take 1 Tab by mouth 2 times a day, with meals. 180 Tab 1   • Evolocumab, REPATHA, 140 MG/ML Solution Auto-injector Inject 1 Each as instructed every 14 days. 2 PEN 11   • Blood Glucose Monitoring Suppl SUPPLIES Misc Test strips order: Test strips for tania contour meter. Sig: use tid 100 Each 11   • TANIA CONTOUR NEXT TEST strip TEST THREE TIMES A DAY FOR UNCONTROLLED SUGAR LEVELS 200 Strip 11   • hydrochlorothiazide (HYDRODIURIL) 12.5 MG tablet Take 1 Tab by mouth every day. 90 Tab 3   • aspirin 81 MG tablet Take 81 mg by mouth every morning.     • Coenzyme Q10 (CO Q 10 PO) Take 300 mg by mouth every day.     • cyanocobalamin (VITAMIN B-12) 100 MCG TABS Take 100 mcg by mouth every day.       No current facility-administered medications for this visit.          Allergies as of 05/01/2018 - Reviewed 05/01/2018   Allergen Reaction Noted   • Statins [hmg-coa-r inhibitors] Unspecified 01/10/2013        ROS: Denies Chest pain, SOB, LE edema.    /78   Pulse 79   Temp 37.1 °C (98.8 °F)   Resp 16   Ht 1.829 m (6')   Wt 111.1 kg (245 lb)   SpO2 95%   BMI 33.23 kg/m²     Physical Exam:  Gen:         Alert and oriented, No apparent distress.  Neck:        No Lymphadenopathy or Bruits.  Lungs:     Clear to auscultation bilaterally  CV:          Regular  rate and rhythm. No murmurs, rubs or gallops.               Ext:          No clubbing, cyanosis, edema.      Assessment and Plan.   68 y.o. male with the following issues.    1. Controlled type 2 diabetes mellitus without complication, without long-term current use of insulin (HCC)  Currently well controlled no changes. Discussed diet and exercise recheck A1c in 6 months. Reminded about yearly eye exam.  - POCT  A1C  - insulin glargine (LANTUS) 100 UNIT/ML Solution Pen-injector injection; Inject 42 Units as instructed every evening.  Dispense: 20 PEN; Refill: 3  - COMP METABOLIC PANEL; Future  - LIPID PROFILE; Future  - HEMOGLOBIN A1C; Future  - MICROALBUMIN CREAT RATIO URINE; Future    2. AV block, 3rd degree (HCC)  Follow along with cardiology    3. Essential hypertension  Currently well controlled, Discuss diet, exercise and salt restriction.  No change to medication therapy.    4. BMI 33.0-33.9,adult  Patient's body mass index is 33.23 kg/m². Exercise and nutrition counseling were performed at this visit.  Set goal of 15lbs in next 6 months.    - Patient identified as having weight management issue.  Appropriate orders and counseling given.

## 2018-07-05 DIAGNOSIS — K21.9 GASTROESOPHAGEAL REFLUX DISEASE WITHOUT ESOPHAGITIS: ICD-10-CM

## 2018-07-05 RX ORDER — OMEPRAZOLE 20 MG/1
20 CAPSULE, DELAYED RELEASE ORAL DAILY
Qty: 90 CAP | Refills: 3 | Status: SHIPPED | OUTPATIENT
Start: 2018-07-05

## 2018-07-13 ENCOUNTER — OFFICE VISIT (OUTPATIENT)
Dept: CARDIOLOGY | Facility: MEDICAL CENTER | Age: 68
End: 2018-07-13
Payer: MEDICARE

## 2018-07-13 ENCOUNTER — NON-PROVIDER VISIT (OUTPATIENT)
Dept: CARDIOLOGY | Facility: MEDICAL CENTER | Age: 68
End: 2018-07-13
Payer: MEDICARE

## 2018-07-13 VITALS
WEIGHT: 250 LBS | HEIGHT: 72 IN | DIASTOLIC BLOOD PRESSURE: 82 MMHG | OXYGEN SATURATION: 93 % | SYSTOLIC BLOOD PRESSURE: 158 MMHG | BODY MASS INDEX: 33.86 KG/M2 | HEART RATE: 75 BPM

## 2018-07-13 DIAGNOSIS — I10 ESSENTIAL HYPERTENSION: ICD-10-CM

## 2018-07-13 DIAGNOSIS — Z95.0 CARDIAC PACEMAKER IN SITU: ICD-10-CM

## 2018-07-13 DIAGNOSIS — Z95.2 H/O AORTIC VALVE REPLACEMENT: ICD-10-CM

## 2018-07-13 DIAGNOSIS — I44.2 AV BLOCK, 3RD DEGREE (HCC): ICD-10-CM

## 2018-07-13 DIAGNOSIS — E78.5 DYSLIPIDEMIA: ICD-10-CM

## 2018-07-13 DIAGNOSIS — Z95.3 HISTORY OF HEART VALVE REPLACEMENT WITH BIOPROSTHETIC VALVE: Primary | ICD-10-CM

## 2018-07-13 DIAGNOSIS — Z95.1 S/P CABG X 1: ICD-10-CM

## 2018-07-13 PROCEDURE — 93280 PM DEVICE PROGR EVAL DUAL: CPT | Performed by: INTERNAL MEDICINE

## 2018-07-13 PROCEDURE — 99214 OFFICE O/P EST MOD 30 MIN: CPT | Performed by: INTERNAL MEDICINE

## 2018-07-13 ASSESSMENT — ENCOUNTER SYMPTOMS: CARDIOVASCULAR NEGATIVE: 1

## 2018-07-13 NOTE — PROGRESS NOTES
Subjective:   Bruno Loera is a 68 -year-old man with a history of severe aortic stenosis status post bioprosthetic surgical AVR in 11/2015, CAD status post single vessel CABG at that time (SVG-RCA), 3rd degree heart block status post pacemaker, former nicotine dependence, type 2 diabetes, hypertension, dyslipidemia, obesity, and statin intolerance.     He has no cardiovascular complaints today, and good control of his blood pressure at home despite his elevated measurement here today.    He comes in with plans now to move to the [facility] to be closer to his father-in-law who just recently had a stroke.  He is also under a bit of emotional stress related to a recent diagnosis of blindness in his dog.    He denies any side effects or issues with his cardiovascular regimen.    He again relates issues with expense of Victoza.    Past Medical History:   Diagnosis Date   • Active smoker 1/12/2015   • Aortic stenosis     11/23/15: s/p aortic valve replacement (23 mm [facility] Trifecta pericardial    • Arthritis     shoulders/hips/hands/back   • CAD (coronary artery disease)     11/23/15: coronary artery bypass grafting x1 (reverse saphenous vein graft to    • Complete heart block (HCC) 11/28/2015   • Dental disorder     upper denture   • Diabetes (HCC)     oral and insulin and victoza   • ED (erectile dysfunction) 1/10/2013   • GERD (gastroesophageal reflux disease)    • Heart burn    • Heart valve disease     has replacement valve   • Hyperlipidemia 1/12/2015   • Hypertension    • Indigestion    • Pacemaker    • Pain 01-13-16    right finger & wrist   • Pain 9/2/16    right hand   • PHN (postherpetic neuralgia)    • S/P CABG x 1 11/29/2015    SVG-RCA at the time of AVR 11/2015    • Statin intolerance 1/12/2015   • TIA 09/2015    no deficits   • TIA (transient ischemic attack) 09-   • Vitamin B12 deficiency 1/10/2013     Past Surgical History:   Procedure Laterality Date   • CUBITAL TUNNEL RELEASE Right  9/7/2016    Procedure: CUBITAL TUNNEL RELEASE W/ SUBCUTANEOUS TRANSPOSITION;  Surgeon: Krzysztof Langley M.D.;  Location: SURGERY [facility];  Service:    • RECOVERY  8/29/2016    Procedure: FLUORO-Cervical CT Qyclbatjx-[id number] SEDATION;  Surgeon: Harjinder Surgery;  Location: SURGERY PRE-POST PROC UNIT [facility];  Service:    • RECOVERY  1/15/2016    Procedure: CATH LAB PM REVISION MEDTRONIC RAMIREZ;  Surgeon: Harjinder Surgery;  Location: SURGERY PRE-POST PROC UNIT [facility];  Service:    • PACEMAKER INSERTION  11-29-15   • AORTIC VALVE REPLACEMENT  11/23/2015    Procedure: AORTIC VALVE REPLACEMENT WITH LAURENCE ;  Surgeon: Ila Woods M.D.;  Location: SURGERY [facility];  Service:    • MULTIPLE CORONARY ARTERY BYPASS ENDO VEIN HARVEST  11/23/2015    Procedure: MULTIPLE CORONARY ARTERY BYPASS ENDO VEIN HARVEST X1;  Surgeon: Ila Woods M.D.;  Location: SURGERY [facility];  Service:    • RECOVERY  11/2/2015    Procedure: CATH LAB-[facility] W/COROS [id number]-[id number] STENOSIS I35.0;  Surgeon: Harjinder Surgery;  Location: SURGERY PRE-POST PROC UNIT [facility];  Service:    • CHOLECYSTECTOMY  2011   • HERNIA REPAIR      umblical     Family History   Problem Relation Age of Onset   • Diabetes Mother    • Hypertension Neg Hx    • Hyperlipidemia Neg Hx    • Cancer Neg Hx      History   Smoking Status   • Former Smoker   • Packs/day: 0.50   • Years: 8.00   • Types: Cigarettes   • Quit date: 8/1/2015   Smokeless Tobacco   • Never Used     Allergies   Allergen Reactions   • Statins [Hmg-Coa-R Inhibitors] Unspecified     Muscle cramps/pain, gout  Pt has tried several different ones and all with problems  [id number]     Outpatient Encounter Prescriptions as of 7/13/2018   Medication Sig Dispense Refill   • omeprazole (PRILOSEC) 20 MG delayed-release capsule Take 1 Cap by mouth every day. 90 Cap 3   • insulin glargine (LANTUS) 100 UNIT/ML Solution Pen-injector injection Inject 42 Units as instructed every  evening. 20 PEN 3   • propranolol (INDERAL) 10 MG Tab Take 1 Tab by mouth 3 times a day as needed (palpitations lasting more than 30 seconds). 30 Tab 6   • lisinopril (PRINIVIL) 20 MG Tab TAKE 1 TAB BY MOUTH 2 TIMES A  Tab 2   • metoprolol (LOPRESSOR) 25 MG Tab TAKE 1 TAB BY MOUTH 2 TIMES A DAY. 180 Tab 2   • metformin (GLUCOPHAGE) 1000 MG tablet Take 1 Tab by mouth 2 times a day, with meals. 180 Tab 1   • Evolocumab, REPATHA, 140 MG/ML Solution Auto-injector Inject 1 Each as instructed every 14 days. 2 PEN 11   • hydrochlorothiazide (HYDRODIURIL) 12.5 MG tablet Take 1 Tab by mouth every day. 90 Tab 3   • aspirin 81 MG tablet Take 81 mg by mouth every morning.     • Coenzyme Q10 (CO Q 10 PO) Take 300 mg by mouth every day.     • cyanocobalamin (VITAMIN B-12) 100 MCG TABS Take 100 mcg by mouth every day.     • [DISCONTINUED] metformin (GLUCOPHAGE) 1000 MG tablet Take 1 Tab by mouth 2 times a day, with meals. 180 Tab 1   • [DISCONTINUED] Insulin Pen Needle (B-D UF III MINI PEN NEEDLES) 31G X 5 MM Misc 1 Each by Does not apply route 2 Times a Day. 200 Each 11   • [DISCONTINUED] liraglutide (VICTOZA) 18 MG/3ML Solution Pen-injector injection Inject 0.3 mL as instructed every day. 18 mL 3   • Blood Glucose Monitoring Suppl SUPPLIES Misc Test strips order: Test strips for tania contour meter. Sig: use tid 100 Each 11   • TANIA CONTOUR NEXT TEST strip TEST THREE TIMES A DAY FOR UNCONTROLLED SUGAR LEVELS 200 Strip 11     No facility-administered encounter medications on file as of 7/13/2018.      Review of Systems   Constitutional:             Cardiovascular: Negative.         He relates a shocklike chest discomfort at the edge of his generator about every 3 months, which he is not concerned about too much   All other systems reviewed and are negative.       Objective:   /82   Pulse 75   Ht 1.829 m (6')   Wt 113.4 kg (250 lb)   SpO2 93%   BMI 33.91 kg/m²     Physical Exam   Constitutional: He is oriented  to person, place, and time. He appears well-developed and well-nourished. No distress.   Pleasant, middle-aged appearing man in no distress accompanied by his wife   MARGARET:   Head: Normocephalic and atraumatic.   Eyes: Conjunctivae and EOM are normal. Pupils are equal, round, and reactive to light. No scleral icterus.   Neck: Neck supple. No JVD present. No tracheal deviation present.   Cardiovascular: Normal rate, regular rhythm and intact distal pulses.  Exam reveals no gallop and no friction rub.    Murmur heard.   Crescendo decrescendo systolic murmur is present with a grade of 2/6   Pulses:       Dorsalis pedis pulses are 2+ on the right side, and 2+ on the left side.   No carotid bruits. Pacemaker generator noted with a well-healed incision and no signs of infection.   Pulmonary/Chest: Effort normal and breath sounds normal. No stridor. No respiratory distress. He has no wheezes. He has no rales.   Abdominal: Soft. Bowel sounds are normal. He exhibits no distension.   Musculoskeletal: He exhibits no edema.   Neurological: He is alert and oriented to person, place, and time.   There is no change in his sensation of numbness in his right fourth and fifth fingers with palpation of his ulnar nerve as it runs through the ulnar groove of the elbow. Nor was there change in sensation with turning his head in either direction and placing downward pressure on his bregma. Full exam deferred.   Skin: Skin is warm and dry. No rash noted. He is not diaphoretic. No erythema. No pallor.   Psychiatric: He has a normal mood and affect. Judgment and thought content normal.   Vitals reviewed.    Lab Results   Component Value Date/Time    WBC 6.4 08/04/2016 08:59 AM    RBC 5.59 08/04/2016 08:59 AM    HEMOGLOBIN 16.7 08/04/2016 08:59 AM    HEMATOCRIT 51.3 08/04/2016 08:59 AM    MCV 91.8 08/04/2016 08:59 AM    MCH 29.9 08/04/2016 08:59 AM    MCHC 32.6 (L) 08/04/2016 08:59 AM    MPV 11.8 08/04/2016 08:59 AM        Lab Results  "  Component Value Date/Time    SODIUM 138 07/13/2017 07:20 AM    POTASSIUM 4.2 07/13/2017 07:20 AM    CHLORIDE 101 07/13/2017 07:20 AM    CO2 28 07/13/2017 07:20 AM    GLUCOSE 181 (H) 07/13/2017 07:20 AM    BUN 14 07/13/2017 07:20 AM    CREATININE 0.97 07/13/2017 07:20 AM        Lab Results   Component Value Date/Time    ASTSGOT 31 11/02/2016 08:35 AM    ALTSGPT 34 11/02/2016 08:35 AM        Lab Results   Component Value Date/Time    CHOLSTRLTOT 121 09/28/2017 06:55 AM    CHOLSTRLTOT 99 (L) 09/28/2017 06:54 AM    LDL 26 09/28/2017 06:54 AM    HDL 51 09/28/2017 06:55 AM    HDL 47 09/28/2017 06:54 AM    TRIGLYCERIDE 154 (H) 09/28/2017 06:55 AM    TRIGLYCERIDE 131 09/28/2017 06:54 AM       Echocardiogram, 1/28/2016:  \"CONCLUSIONS  Compared to the images of the study done 09/21/2015- there has been.   Mild concentric left ventricular hypertrophy.  Left ventricular ejection fraction is visually estimated to be 65%.  Normal left ventricular systolic function.  Grade I diastolic dysfunction.  Known bioprosthetic aortic valve that is functioning normally with normal transvalvular gradients.  Mild mitral regurgitation.  Mitral annular calcification.  Estimated right ventricular systolic pressure  is 30 mmHg\"    Echocardiogram, 8/5/2017:  \"CONCLUSIONS  Normal chamber sizes. Normal RV and LV systolic function. LVEF is 60% visually. There is grade I diastolic dysfunction. Normal functioning bioprosthetic aortic valve. No other significant vavular disease. RVSP estimated at 45 mmHg. IVC is not well visualized\"    Assessment:     1. History of heart valve replacement with bioprosthetic valve [Z95.4]     2. Cardiac pacemaker in situ     3. AV block, 3rd degree (HCC)     4. H/O aortic valve replacement     5. S/P CABG x 1     6. Essential hypertension     7. Dyslipidemia         Medical Decision Making:  Today's Assessment / Status / Plan:     He is doing very well today, and euvolemic on physical examination with normal function " of his bioprosthetic aortic valve also on physical examination.  His blood pressure is well controlled at home though a bit elevated in our office in the context of a bit of anxiety regarding his upcoming move to the [place], [place] area to be closer to his father-in-law who recently had a stroke.  His pacemaker is functioning well, and his lipids are well controlled on Repatha.  I have made no changes to his cardiovascular regimen nor ordered additional testing.  I recommended that he establish with a new cardiologist in [place] especially as he is done so well on his PCSK-9 inhibitor.    Carloz Mcallister MD  Cardiologist, [facility] [place]     No Follow-up on file.    Physical Exam   Constitutional: He is oriented to person, place, and time. He appears well-developed and well-nourished. No distress.   Pleasant, middle-aged appearing man in no distress accompanied by his wife   HENT:   Head: Normocephalic and atraumatic.   Eyes: Conjunctivae and EOM are normal. Pupils are equal, round, and reactive to light. No scleral icterus.   Neck: Neck supple. No JVD present. No tracheal deviation present.   Cardiovascular: Normal rate, regular rhythm and intact distal pulses.  Exam reveals no gallop and no friction rub.    Murmur heard.   Crescendo decrescendo systolic murmur is present with a grade of 2/6   Pulses:       Dorsalis pedis pulses are 2+ on the right side, and 2+ on the left side.   No carotid bruits. Pacemaker generator noted with a well-healed incision and no signs of infection.   Pulmonary/Chest: Effort normal and breath sounds normal. No stridor. No respiratory distress. He has no wheezes. He has no rales.   Abdominal: Soft. Bowel sounds are normal. He exhibits no distension.   Musculoskeletal: He exhibits no edema.   Neurological: He is alert and oriented to person, place, and time.   There is no change in his sensation of numbness in his right fourth and fifth fingers with palpation of  his ulnar nerve as it runs through the ulnar groove of the elbow. Nor was there change in sensation with turning his head in either direction and placing downward pressure on his bregma. Full exam deferred.   Skin: Skin is warm and dry. No rash noted. He is not diaphoretic. No erythema. No pallor.   Psychiatric: He has a normal mood and affect. Judgment and thought content normal.   Vitals reviewed.

## 2018-07-13 NOTE — LETTER
Name:          Bruno Loera   YOB: 1950  Date:     07/13/2018      Cezar Farias M.D.  [address]  [facility] [address]     Carloz Mcallister MD  [address], [address]  [place] [address]  Phone: [phone]  Back Line: [phone]  Fax: [phone]  E-mail: [email or web address]   Dear Dr. Farias,    We had the pleasure of seeing your patient, Bruno Loera, in Cardiology Clinic at [facility] today.    As you know, he is a 68-year-old man with a history of severe aortic stenosis status post bioprosthetic surgical AVR in 11/2015, CAD status post single vessel CABG at that time (SVG-RCA), 3rd degree heart block status post pacemaker, former nicotine dependence, type 2 diabetes, hypertension, dyslipidemia, obesity, and statin intolerance.    He is doing very well today, and euvolemic on physical examination with normal function of his bioprosthetic aortic valve also on physical examination.  His blood pressure is well controlled at home though a bit elevated in our office in the context of a bit of anxiety regarding his upcoming move to the [place], [place] area to be closer to his father-in-law who recently had a stroke.  His pacemaker is functioning well, and his lipids are well controlled on Repatha.  I have made no changes to his cardiovascular regimen nor ordered additional testing.  I recommended that he establish with a new cardiologist in [place] especially as he is done so well on his PCSK-9 inhibitor.    He does continue to relate to me issues with affording Victoza.  I will defer diabetes management to the primary care setting.    No Follow-up on file.    Thank you for the referral and please do not hesitate to contact me at any time. My contact information is listed above.    This note was dictated using Dragon speech recognition software.     A full note including my physical examination and a full list of rectified medications is available in our  medical record, and can be faxed as well.    Carloz Mcallister MD  Cardiologist  [facility]

## 2018-10-04 RX ORDER — HYDROCHLOROTHIAZIDE 12.5 MG/1
12.5 CAPSULE, GELATIN COATED ORAL DAILY
Qty: 90 CAP | Refills: 3 | Status: SHIPPED | OUTPATIENT
Start: 2018-10-04

## 2019-04-16 ENCOUNTER — TELEPHONE (OUTPATIENT)
Dept: VASCULAR LAB | Facility: MEDICAL CENTER | Age: 69
End: 2019-04-16

## 2019-04-16 NOTE — TELEPHONE ENCOUNTER
[facility]    Spoke with pt and confirmed he has moved to [place] and has established care with [facility].  He has seen his new cardiologist who is trying to establish pt back on PCSK9 therapy.      Will suggest pt is discharged from [facility].    Frank Woods, PharmD

## 2021-01-15 DIAGNOSIS — Z23 NEED FOR VACCINATION: ICD-10-CM
